# Patient Record
Sex: MALE | NOT HISPANIC OR LATINO | Employment: FULL TIME | URBAN - METROPOLITAN AREA
[De-identification: names, ages, dates, MRNs, and addresses within clinical notes are randomized per-mention and may not be internally consistent; named-entity substitution may affect disease eponyms.]

---

## 2023-05-30 DIAGNOSIS — I10 HTN (HYPERTENSION), BENIGN: ICD-10-CM

## 2023-05-30 PROBLEM — G89.29 CHRONIC PAIN OF RIGHT HEEL: Status: ACTIVE | Noted: 2023-05-30

## 2023-05-30 PROBLEM — Z86.0100 HISTORY OF COLON POLYPS: Status: ACTIVE | Noted: 2023-05-30

## 2023-05-30 PROBLEM — R79.89 ELEVATED LFTS: Status: ACTIVE | Noted: 2023-05-30

## 2023-05-30 PROBLEM — Z86.010 HISTORY OF COLON POLYPS: Status: ACTIVE | Noted: 2023-05-30

## 2023-05-30 PROBLEM — R17 ELEVATED BILIRUBIN: Status: ACTIVE | Noted: 2023-05-30

## 2023-05-30 PROBLEM — R80.9 PROTEINURIA: Status: ACTIVE | Noted: 2023-05-30

## 2023-05-30 PROBLEM — M79.671 CHRONIC PAIN OF RIGHT HEEL: Status: ACTIVE | Noted: 2023-05-30

## 2023-05-30 PROBLEM — N40.0 BPH WITHOUT URINARY OBSTRUCTION: Status: ACTIVE | Noted: 2023-05-30

## 2023-05-30 PROBLEM — E78.2 MIXED HYPERLIPIDEMIA: Status: ACTIVE | Noted: 2023-05-30

## 2023-05-30 RX ORDER — AMLODIPINE BESYLATE 5 MG/1
5 TABLET ORAL DAILY
COMMUNITY
Start: 2023-03-07 | End: 2023-05-30 | Stop reason: SDUPTHER

## 2023-05-30 RX ORDER — LISINOPRIL 40 MG/1
40 TABLET ORAL DAILY
Qty: 90 TABLET | Refills: 3 | OUTPATIENT
Start: 2023-05-30

## 2023-05-30 RX ORDER — LISINOPRIL 40 MG/1
40 TABLET ORAL DAILY
Qty: 90 TABLET | Refills: 3 | Status: SHIPPED | OUTPATIENT
Start: 2023-05-30 | End: 2024-02-26 | Stop reason: SDUPTHER

## 2023-05-30 RX ORDER — DICLOFENAC SODIUM AND MISOPROSTOL 75; 200 MG/1; UG/1
1 TABLET, DELAYED RELEASE ORAL 2 TIMES DAILY PRN
COMMUNITY
End: 2023-11-27 | Stop reason: SDUPTHER

## 2023-05-30 RX ORDER — ATORVASTATIN CALCIUM 10 MG/1
10 TABLET, FILM COATED ORAL NIGHTLY
COMMUNITY
End: 2024-01-09 | Stop reason: SDUPTHER

## 2023-05-30 RX ORDER — LISINOPRIL 40 MG/1
40 TABLET ORAL DAILY
COMMUNITY
Start: 2023-03-07 | End: 2023-05-30 | Stop reason: SDUPTHER

## 2023-05-30 RX ORDER — FINASTERIDE 5 MG/1
1 TABLET, FILM COATED ORAL DAILY
COMMUNITY
End: 2023-07-31 | Stop reason: SDUPTHER

## 2023-05-30 RX ORDER — AMLODIPINE BESYLATE 5 MG/1
5 TABLET ORAL DAILY
Qty: 90 TABLET | Refills: 3 | Status: SHIPPED | OUTPATIENT
Start: 2023-05-30 | End: 2024-02-26 | Stop reason: SDUPTHER

## 2023-05-30 RX ORDER — BENZONATATE 100 MG/1
CAPSULE ORAL
COMMUNITY
Start: 2022-07-24 | End: 2023-11-27 | Stop reason: ALTCHOICE

## 2023-07-31 DIAGNOSIS — N40.0 BPH WITHOUT URINARY OBSTRUCTION: ICD-10-CM

## 2023-07-31 RX ORDER — FINASTERIDE 5 MG/1
5 TABLET, FILM COATED ORAL DAILY
Qty: 90 TABLET | Refills: 1 | Status: SHIPPED | OUTPATIENT
Start: 2023-07-31 | End: 2023-08-01 | Stop reason: SDUPTHER

## 2023-08-01 DIAGNOSIS — N40.0 BPH WITHOUT URINARY OBSTRUCTION: ICD-10-CM

## 2023-08-01 NOTE — TELEPHONE ENCOUNTER
Please re-do refill he wants a written rx and he will pick it up Finasteride,  Call when ready    [phone]  update number

## 2023-08-02 RX ORDER — FINASTERIDE 5 MG/1
5 TABLET, FILM COATED ORAL DAILY
Qty: 90 TABLET | Refills: 1 | Status: SHIPPED | OUTPATIENT
Start: 2023-08-02 | End: 2023-08-10 | Stop reason: SDUPTHER

## 2023-08-10 DIAGNOSIS — N40.0 BPH WITHOUT URINARY OBSTRUCTION: ICD-10-CM

## 2023-08-10 RX ORDER — FINASTERIDE 5 MG/1
5 TABLET, FILM COATED ORAL DAILY
Qty: 90 TABLET | Refills: 3 | Status: SHIPPED | OUTPATIENT
Start: 2023-08-10

## 2023-11-27 ENCOUNTER — OFFICE VISIT (OUTPATIENT)
Dept: PRIMARY CARE | Facility: CLINIC | Age: 73
End: 2023-11-27
Payer: MEDICARE

## 2023-11-27 VITALS
DIASTOLIC BLOOD PRESSURE: 74 MMHG | RESPIRATION RATE: 14 BRPM | HEART RATE: 64 BPM | HEIGHT: 66 IN | WEIGHT: 224.5 LBS | BODY MASS INDEX: 36.08 KG/M2 | SYSTOLIC BLOOD PRESSURE: 134 MMHG

## 2023-11-27 DIAGNOSIS — Z00.00 MEDICARE ANNUAL WELLNESS VISIT, INITIAL: Primary | ICD-10-CM

## 2023-11-27 DIAGNOSIS — Z86.010 HISTORY OF COLON POLYPS: ICD-10-CM

## 2023-11-27 DIAGNOSIS — I10 HTN (HYPERTENSION), BENIGN: ICD-10-CM

## 2023-11-27 DIAGNOSIS — E78.2 MIXED HYPERLIPIDEMIA: ICD-10-CM

## 2023-11-27 DIAGNOSIS — E66.01 CLASS 2 SEVERE OBESITY DUE TO EXCESS CALORIES WITH SERIOUS COMORBIDITY AND BODY MASS INDEX (BMI) OF 36.0 TO 36.9 IN ADULT (MULTI): ICD-10-CM

## 2023-11-27 DIAGNOSIS — R80.9 PROTEINURIA, UNSPECIFIED TYPE: ICD-10-CM

## 2023-11-27 DIAGNOSIS — M79.671 CHRONIC PAIN OF RIGHT HEEL: ICD-10-CM

## 2023-11-27 DIAGNOSIS — G89.29 CHRONIC PAIN OF RIGHT HEEL: ICD-10-CM

## 2023-11-27 DIAGNOSIS — Z00.00 ROUTINE GENERAL MEDICAL EXAMINATION AT HEALTH CARE FACILITY: ICD-10-CM

## 2023-11-27 DIAGNOSIS — N40.0 BPH WITHOUT URINARY OBSTRUCTION: ICD-10-CM

## 2023-11-27 PROBLEM — E66.812 CLASS 2 SEVERE OBESITY DUE TO EXCESS CALORIES WITH SERIOUS COMORBIDITY AND BODY MASS INDEX (BMI) OF 36.0 TO 36.9 IN ADULT: Status: ACTIVE | Noted: 2023-11-27

## 2023-11-27 PROCEDURE — 3008F BODY MASS INDEX DOCD: CPT | Performed by: INTERNAL MEDICINE

## 2023-11-27 PROCEDURE — 3075F SYST BP GE 130 - 139MM HG: CPT | Performed by: INTERNAL MEDICINE

## 2023-11-27 PROCEDURE — G0402 INITIAL PREVENTIVE EXAM: HCPCS | Performed by: INTERNAL MEDICINE

## 2023-11-27 PROCEDURE — 3078F DIAST BP <80 MM HG: CPT | Performed by: INTERNAL MEDICINE

## 2023-11-27 PROCEDURE — 1170F FXNL STATUS ASSESSED: CPT | Performed by: INTERNAL MEDICINE

## 2023-11-27 PROCEDURE — 1159F MED LIST DOCD IN RCRD: CPT | Performed by: INTERNAL MEDICINE

## 2023-11-27 PROCEDURE — 80053 COMPREHEN METABOLIC PANEL: CPT

## 2023-11-27 PROCEDURE — 80061 LIPID PANEL: CPT

## 2023-11-27 PROCEDURE — 1036F TOBACCO NON-USER: CPT | Performed by: INTERNAL MEDICINE

## 2023-11-27 PROCEDURE — 84153 ASSAY OF PSA TOTAL: CPT

## 2023-11-27 PROCEDURE — 36415 COLL VENOUS BLD VENIPUNCTURE: CPT

## 2023-11-27 PROCEDURE — 81003 URINALYSIS AUTO W/O SCOPE: CPT

## 2023-11-27 PROCEDURE — 99214 OFFICE O/P EST MOD 30 MIN: CPT | Performed by: INTERNAL MEDICINE

## 2023-11-27 PROCEDURE — 85027 COMPLETE CBC AUTOMATED: CPT

## 2023-11-27 PROCEDURE — 1160F RVW MEDS BY RX/DR IN RCRD: CPT | Performed by: INTERNAL MEDICINE

## 2023-11-27 RX ORDER — DICLOFENAC SODIUM AND MISOPROSTOL 75; 200 MG/1; UG/1
1 TABLET, DELAYED RELEASE ORAL 2 TIMES DAILY PRN
Qty: 180 TABLET | Refills: 1 | Status: SHIPPED | OUTPATIENT
Start: 2023-11-27 | End: 2024-05-16 | Stop reason: SDUPTHER

## 2023-11-27 ASSESSMENT — ENCOUNTER SYMPTOMS
DYSURIA: 0
STRIDOR: 0
NECK STIFFNESS: 0
CHILLS: 0
SHORTNESS OF BREATH: 0
FLANK PAIN: 0
FREQUENCY: 0
APNEA: 0
EYE DISCHARGE: 0
SINUS PRESSURE: 0
SLEEP DISTURBANCE: 0
HYPERACTIVE: 0
LIGHT-HEADEDNESS: 0
APPETITE CHANGE: 0
BRUISES/BLEEDS EASILY: 0
PALPITATIONS: 0
POLYDIPSIA: 0
DIFFICULTY URINATING: 0
TROUBLE SWALLOWING: 0
DECREASED CONCENTRATION: 0
RECTAL PAIN: 0
CHOKING: 0
SPEECH DIFFICULTY: 0
AGITATION: 0
NUMBNESS: 0
CONSTIPATION: 1
COUGH: 0
FEVER: 0
WEAKNESS: 0
ABDOMINAL PAIN: 0
ABDOMINAL DISTENTION: 0
DIARRHEA: 0
NAUSEA: 0
NECK PAIN: 0
DIZZINESS: 0
FACIAL SWELLING: 0
VOMITING: 0
HALLUCINATIONS: 0
ACTIVITY CHANGE: 0
VOICE CHANGE: 0
ADENOPATHY: 0
RHINORRHEA: 0
ARTHRALGIAS: 0
POLYPHAGIA: 0
JOINT SWELLING: 0
HEADACHES: 0
CONFUSION: 0
MYALGIAS: 0
EYE ITCHING: 0
FACIAL ASYMMETRY: 0
BACK PAIN: 0
PHOTOPHOBIA: 0
COLOR CHANGE: 0
ANAL BLEEDING: 0
EYE REDNESS: 0
SEIZURES: 0
FATIGUE: 0
WOUND: 0
EYE PAIN: 0
BLOOD IN STOOL: 0
CHEST TIGHTNESS: 0
SINUS PAIN: 0
NERVOUS/ANXIOUS: 0
HEMATURIA: 0
DYSPHORIC MOOD: 0
UNEXPECTED WEIGHT CHANGE: 0
WHEEZING: 0
DIAPHORESIS: 0
TREMORS: 0
SORE THROAT: 0

## 2023-11-27 ASSESSMENT — ACTIVITIES OF DAILY LIVING (ADL)
TAKING_MEDICATION: INDEPENDENT
DOING_HOUSEWORK: INDEPENDENT
GROCERY_SHOPPING: INDEPENDENT
MANAGING_FINANCES: INDEPENDENT
DRESSING: INDEPENDENT
BATHING: INDEPENDENT

## 2023-11-27 ASSESSMENT — VISUAL ACUITY
OD_CC: 20/25
OS_CC: 20/25

## 2023-11-27 ASSESSMENT — PATIENT HEALTH QUESTIONNAIRE - PHQ9
2. FEELING DOWN, DEPRESSED OR HOPELESS: NOT AT ALL
1. LITTLE INTEREST OR PLEASURE IN DOING THINGS: NOT AT ALL
SUM OF ALL RESPONSES TO PHQ9 QUESTIONS 1 AND 2: 0

## 2023-11-27 NOTE — PROGRESS NOTES
Subjective   Reason for Visit: Hugh Ruano is an 73 y.o. male here for a Medicare Wellness visit.     Past Medical, Surgical, and Family History reviewed and updated in chart.    Reviewed all medications by prescribing practitioner or clinical pharmacist (such as prescriptions, OTCs, herbal therapies and supplements) and documented in the medical record.    HPI  Pt here for MWV.  He retired back in 2/2023.  He has been hiking 3-4 times a week for exercise.  He is up in weight.      He takes his BP at home-120/80 in AM and 140/80 in the later part of day.  He is taking his Lisinopril/Amlodipine.      He is having no urinary issues.  He is able to sleep through night.  Takes finasteride.      He has to use laxatives 2-3 times a week due to constipation.  He had colonoscopy in 2021 and did have 2 polyps.  He has no blood/black tarry stools.  He doesn't have any abdominal pain.  He will repeat in 5 years.  He does have family history of colon cancer (mother).      He takes Arthotec 1pill at minimum a day for heel pain and this does help.     He sees Derm and just had basal cell CA removed 2 weeks ago.      He had Covid and Flu shot already.  He had his shingrix and pneumonia series.        Patient Care Team:  Elvie MELÉNDEZ DO as PCP - General     Review of Systems   Constitutional:  Negative for activity change, appetite change, chills, diaphoresis, fatigue, fever and unexpected weight change.   HENT:  Negative for congestion, dental problem, drooling, ear discharge, ear pain, facial swelling, hearing loss, mouth sores, nosebleeds, postnasal drip, rhinorrhea, sinus pressure, sinus pain, sneezing, sore throat, tinnitus, trouble swallowing and voice change.    Eyes:  Positive for visual disturbance (wears glasses-up to date on exam). Negative for photophobia, pain, discharge, redness and itching.   Respiratory:  Negative for apnea, cough, choking, chest tightness, shortness of breath, wheezing and stridor.   "  Cardiovascular:  Negative for chest pain, palpitations and leg swelling.   Gastrointestinal:  Positive for constipation. Negative for abdominal distention, abdominal pain, anal bleeding, blood in stool, diarrhea, nausea, rectal pain and vomiting.   Endocrine: Negative for cold intolerance, heat intolerance, polydipsia, polyphagia and polyuria.   Genitourinary:  Negative for decreased urine volume, difficulty urinating, dysuria, enuresis, flank pain, frequency, genital sores, hematuria, penile discharge, penile pain, penile swelling, scrotal swelling, testicular pain and urgency.   Musculoskeletal:  Negative for arthralgias, back pain, gait problem, joint swelling, myalgias, neck pain and neck stiffness.   Skin:  Negative for color change, pallor, rash and wound.   Allergic/Immunologic: Negative for environmental allergies, food allergies and immunocompromised state.   Neurological:  Negative for dizziness, tremors, seizures, syncope, facial asymmetry, speech difficulty, weakness, light-headedness, numbness and headaches.   Hematological:  Negative for adenopathy. Does not bruise/bleed easily.   Psychiatric/Behavioral:  Negative for agitation, behavioral problems, confusion, decreased concentration, dysphoric mood, hallucinations, self-injury, sleep disturbance and suicidal ideas. The patient is not nervous/anxious and is not hyperactive.        Objective   Vitals:  /74 (BP Location: Left arm, Patient Position: Sitting)   Pulse 64   Resp 14   Ht 1.67 m (5' 5.75\")   Wt 102 kg (224 lb 8 oz)   BMI 36.51 kg/m²       Physical Exam  Constitutional:       Appearance: Normal appearance. He is obese.   HENT:      Head: Normocephalic and atraumatic.      Right Ear: Tympanic membrane, ear canal and external ear normal. There is no impacted cerumen.      Left Ear: Tympanic membrane, ear canal and external ear normal. There is no impacted cerumen.      Nose: Nose normal. No congestion or rhinorrhea.      " Mouth/Throat:      Mouth: Mucous membranes are moist.      Pharynx: Oropharynx is clear. No oropharyngeal exudate or posterior oropharyngeal erythema.   Eyes:      Extraocular Movements: Extraocular movements intact.      Conjunctiva/sclera: Conjunctivae normal.      Pupils: Pupils are equal, round, and reactive to light.   Neck:      Vascular: No carotid bruit.   Cardiovascular:      Rate and Rhythm: Normal rate and regular rhythm.      Pulses: Normal pulses.      Heart sounds: Normal heart sounds. No murmur heard.  Pulmonary:      Effort: Pulmonary effort is normal. No respiratory distress.      Breath sounds: Normal breath sounds. No wheezing, rhonchi or rales.   Abdominal:      General: Abdomen is flat. Bowel sounds are normal. There is no distension.      Palpations: Abdomen is soft.      Tenderness: There is no abdominal tenderness.      Hernia: No hernia is present.   Musculoskeletal:         General: No swelling or tenderness. Normal range of motion.      Cervical back: Normal range of motion and neck supple.      Right lower leg: No edema.      Left lower leg: No edema.   Lymphadenopathy:      Cervical: No cervical adenopathy.   Skin:     General: Skin is warm and dry.      Findings: No lesion or rash.   Neurological:      General: No focal deficit present.      Mental Status: He is alert and oriented to person, place, and time.      Cranial Nerves: No cranial nerve deficit.      Sensory: No sensory deficit.      Motor: No weakness.   Psychiatric:         Mood and Affect: Mood normal.         Behavior: Behavior normal.         Thought Content: Thought content normal.         Judgment: Judgment normal.         Assessment/Plan   Problem List Items Addressed This Visit       BPH without urinary obstruction    Current Assessment & Plan     On Finasteride   PSA testing today          Relevant Orders    Prostate Specific Antigen    Follow Up In Primary Care - Established    Chronic pain of right heel    Current  Assessment & Plan     Uses Diclofenac combo one a day  Good supporting shoes         Relevant Medications    diclofenac-misoprostoL (Arthrotec 75)  mg-mcg EC tablet    Other Relevant Orders    Follow Up In Primary Care - Established    History of colon polyps    Current Assessment & Plan     Had colonoscopy in 2021  Repeat in 5 years (2026)         HTN (hypertension), benign    Current Assessment & Plan     Adequate control on current regimen  Low salt diet  Weight loss         Relevant Orders    Comprehensive Metabolic Panel    CBC    Urinalysis with Reflex Microscopic    Follow Up In Primary Care - Established    Mixed hyperlipidemia    Current Assessment & Plan     On statin therapy  Repeat lipids today          Relevant Orders    Lipid Panel    Follow Up In Primary Care - Established    Proteinuria    Relevant Orders    Follow Up In Primary Care - Established    Class 2 severe obesity due to excess calories with serious comorbidity and body mass index (BMI) of 36.0 to 36.9 in adult (CMS/[facility])    Current Assessment & Plan     Recommend weight loss with healthy diet and exercise          Other Visit Diagnoses       Medicare annual wellness visit, initial    -  Primary    Routine general medical examination at health care facility

## 2023-11-27 NOTE — PATIENT INSTRUCTIONS
Bring in copy of living will/DPOA of healthcare for our records   Continue medications as directed-refills provided  Continue exercising with goal of weight loss-healthy diet (lean meats/fish) and exercise-goal of 150 minutes/week  Follow up in 6 months

## 2023-11-28 LAB
ALBUMIN SERPL BCP-MCNC: 4.8 G/DL (ref 3.4–5)
ALP SERPL-CCNC: 74 U/L (ref 33–136)
ALT SERPL W P-5'-P-CCNC: 48 U/L (ref 10–52)
ANION GAP SERPL CALC-SCNC: 15 MMOL/L (ref 10–20)
APPEARANCE UR: ABNORMAL
AST SERPL W P-5'-P-CCNC: 23 U/L (ref 9–39)
BILIRUB SERPL-MCNC: 0.5 MG/DL (ref 0–1.2)
BILIRUB UR STRIP.AUTO-MCNC: NEGATIVE MG/DL
BUN SERPL-MCNC: 21 MG/DL (ref 6–23)
CALCIUM SERPL-MCNC: 9.7 MG/DL (ref 8.6–10.6)
CHLORIDE SERPL-SCNC: 103 MMOL/L (ref 98–107)
CHOLEST SERPL-MCNC: 155 MG/DL (ref 0–199)
CHOLESTEROL/HDL RATIO: 3.2
CO2 SERPL-SCNC: 28 MMOL/L (ref 21–32)
COLOR UR: ABNORMAL
CREAT SERPL-MCNC: 1.15 MG/DL (ref 0.5–1.3)
ERYTHROCYTE [DISTWIDTH] IN BLOOD BY AUTOMATED COUNT: 11.9 % (ref 11.5–14.5)
GFR SERPL CREATININE-BSD FRML MDRD: 68 ML/MIN/1.73M*2
GLUCOSE SERPL-MCNC: 89 MG/DL (ref 74–99)
GLUCOSE UR STRIP.AUTO-MCNC: NEGATIVE MG/DL
HCT VFR BLD AUTO: 47.7 % (ref 41–52)
HDLC SERPL-MCNC: 47.8 MG/DL
HGB BLD-MCNC: 15 G/DL (ref 13.5–17.5)
KETONES UR STRIP.AUTO-MCNC: NEGATIVE MG/DL
LDLC SERPL CALC-MCNC: 82 MG/DL
LEUKOCYTE ESTERASE UR QL STRIP.AUTO: NEGATIVE
MCH RBC QN AUTO: 28.9 PG (ref 26–34)
MCHC RBC AUTO-ENTMCNC: 31.4 G/DL (ref 32–36)
MCV RBC AUTO: 92 FL (ref 80–100)
NITRITE UR QL STRIP.AUTO: NEGATIVE
NON HDL CHOLESTEROL: 107 MG/DL (ref 0–149)
NRBC BLD-RTO: 0 /100 WBCS (ref 0–0)
PH UR STRIP.AUTO: 5 [PH]
PLATELET # BLD AUTO: 333 X10*3/UL (ref 150–450)
POTASSIUM SERPL-SCNC: 4.4 MMOL/L (ref 3.5–5.3)
PROT SERPL-MCNC: 7 G/DL (ref 6.4–8.2)
PROT UR STRIP.AUTO-MCNC: NEGATIVE MG/DL
PSA SERPL-MCNC: 0.18 NG/ML
RBC # BLD AUTO: 5.19 X10*6/UL (ref 4.5–5.9)
RBC # UR STRIP.AUTO: NEGATIVE /UL
SODIUM SERPL-SCNC: 142 MMOL/L (ref 136–145)
SP GR UR STRIP.AUTO: 1.02
TRIGL SERPL-MCNC: 127 MG/DL (ref 0–149)
UROBILINOGEN UR STRIP.AUTO-MCNC: <2 MG/DL
VLDL: 25 MG/DL (ref 0–40)
WBC # BLD AUTO: 7.4 X10*3/UL (ref 4.4–11.3)

## 2024-01-09 DIAGNOSIS — E78.2 MIXED HYPERLIPIDEMIA: Primary | ICD-10-CM

## 2024-01-09 RX ORDER — ATORVASTATIN CALCIUM 10 MG/1
10 TABLET, FILM COATED ORAL NIGHTLY
Qty: 90 TABLET | Refills: 1 | Status: SHIPPED | OUTPATIENT
Start: 2024-01-09 | End: 2024-02-26 | Stop reason: SDUPTHER

## 2024-02-26 DIAGNOSIS — E78.2 MIXED HYPERLIPIDEMIA: ICD-10-CM

## 2024-02-26 DIAGNOSIS — I10 HTN (HYPERTENSION), BENIGN: ICD-10-CM

## 2024-02-26 RX ORDER — AMLODIPINE BESYLATE 5 MG/1
5 TABLET ORAL DAILY
Qty: 90 TABLET | Refills: 3 | Status: SHIPPED | OUTPATIENT
Start: 2024-02-26

## 2024-02-26 RX ORDER — LISINOPRIL 40 MG/1
40 TABLET ORAL DAILY
Qty: 90 TABLET | Refills: 3 | Status: SHIPPED | OUTPATIENT
Start: 2024-02-26

## 2024-02-26 RX ORDER — ATORVASTATIN CALCIUM 10 MG/1
10 TABLET, FILM COATED ORAL NIGHTLY
Qty: 90 TABLET | Refills: 3 | Status: SHIPPED | OUTPATIENT
Start: 2024-02-26

## 2024-05-16 DIAGNOSIS — M79.671 CHRONIC PAIN OF RIGHT HEEL: ICD-10-CM

## 2024-05-16 DIAGNOSIS — G89.29 CHRONIC PAIN OF RIGHT HEEL: ICD-10-CM

## 2024-05-16 RX ORDER — DICLOFENAC SODIUM AND MISOPROSTOL 75; 200 MG/1; UG/1
1 TABLET, DELAYED RELEASE ORAL 2 TIMES DAILY PRN
Qty: 180 TABLET | Refills: 1 | Status: SHIPPED | OUTPATIENT
Start: 2024-05-16

## 2024-05-30 ENCOUNTER — OFFICE VISIT (OUTPATIENT)
Dept: PRIMARY CARE | Facility: CLINIC | Age: 74
End: 2024-05-30
Payer: MEDICARE

## 2024-05-30 VITALS
SYSTOLIC BLOOD PRESSURE: 144 MMHG | DIASTOLIC BLOOD PRESSURE: 78 MMHG | WEIGHT: 224.1 LBS | HEART RATE: 68 BPM | BODY MASS INDEX: 36.45 KG/M2

## 2024-05-30 DIAGNOSIS — G89.29 CHRONIC PAIN OF RIGHT HEEL: ICD-10-CM

## 2024-05-30 DIAGNOSIS — E78.2 MIXED HYPERLIPIDEMIA: ICD-10-CM

## 2024-05-30 DIAGNOSIS — R80.9 PROTEINURIA, UNSPECIFIED TYPE: ICD-10-CM

## 2024-05-30 DIAGNOSIS — N40.0 BPH WITHOUT URINARY OBSTRUCTION: ICD-10-CM

## 2024-05-30 DIAGNOSIS — E66.01 CLASS 2 SEVERE OBESITY DUE TO EXCESS CALORIES WITH SERIOUS COMORBIDITY AND BODY MASS INDEX (BMI) OF 36.0 TO 36.9 IN ADULT (MULTI): ICD-10-CM

## 2024-05-30 DIAGNOSIS — I10 HTN (HYPERTENSION), BENIGN: Primary | ICD-10-CM

## 2024-05-30 DIAGNOSIS — K21.9 GASTROESOPHAGEAL REFLUX DISEASE WITHOUT ESOPHAGITIS: ICD-10-CM

## 2024-05-30 DIAGNOSIS — M79.671 CHRONIC PAIN OF RIGHT HEEL: ICD-10-CM

## 2024-05-30 PROCEDURE — 1159F MED LIST DOCD IN RCRD: CPT | Performed by: INTERNAL MEDICINE

## 2024-05-30 PROCEDURE — 1123F ACP DISCUSS/DSCN MKR DOCD: CPT | Performed by: INTERNAL MEDICINE

## 2024-05-30 PROCEDURE — 36415 COLL VENOUS BLD VENIPUNCTURE: CPT

## 2024-05-30 PROCEDURE — 99214 OFFICE O/P EST MOD 30 MIN: CPT | Performed by: INTERNAL MEDICINE

## 2024-05-30 PROCEDURE — 80048 BASIC METABOLIC PNL TOTAL CA: CPT

## 2024-05-30 PROCEDURE — 3077F SYST BP >= 140 MM HG: CPT | Performed by: INTERNAL MEDICINE

## 2024-05-30 PROCEDURE — 3008F BODY MASS INDEX DOCD: CPT | Performed by: INTERNAL MEDICINE

## 2024-05-30 PROCEDURE — 1160F RVW MEDS BY RX/DR IN RCRD: CPT | Performed by: INTERNAL MEDICINE

## 2024-05-30 PROCEDURE — 3078F DIAST BP <80 MM HG: CPT | Performed by: INTERNAL MEDICINE

## 2024-05-30 RX ORDER — OMEPRAZOLE 20 MG/1
20-40 CAPSULE, DELAYED RELEASE ORAL DAILY PRN
Qty: 90 CAPSULE | Refills: 0 | Status: SHIPPED | OUTPATIENT
Start: 2024-05-30 | End: 2026-05-20

## 2024-05-30 ASSESSMENT — ENCOUNTER SYMPTOMS
ACTIVITY CHANGE: 0
CHEST TIGHTNESS: 0
VOMITING: 0
HEADACHES: 0
WHEEZING: 0
ROS GI COMMENTS: +GERD
PALPITATIONS: 0
NAUSEA: 0
FEVER: 0
BACK PAIN: 0
NERVOUS/ANXIOUS: 0
LIGHT-HEADEDNESS: 0
DIAPHORESIS: 0
DIFFICULTY URINATING: 0
APPETITE CHANGE: 0
UNEXPECTED WEIGHT CHANGE: 0
ARTHRALGIAS: 0
SLEEP DISTURBANCE: 0
FATIGUE: 0
DYSURIA: 0
CHILLS: 0
CONSTIPATION: 0
DYSPHORIC MOOD: 0
DIZZINESS: 0
DIARRHEA: 0
SHORTNESS OF BREATH: 0
COUGH: 0
ABDOMINAL PAIN: 0

## 2024-05-30 NOTE — PATIENT INSTRUCTIONS
We printed a script for Omeprazole 20 mg to use as needed when you have acid reflux which could be cause of cough  Continue to work hard on weight loss-healthy diet-lean protein/fish and veggies-lower carbs/sugar intake  Exercise with goal of 150 minutes/week  We did small amount of blood work today  Continue all medications as directed-call when refills needed  Follow up in 6 months for full physical (come fasting for full blood work and urine panel)

## 2024-05-30 NOTE — ASSESSMENT & PLAN NOTE
Weight is stable   He walks daily and riding his bike some  Appetite is good but admits to eating a lot of dairy

## 2024-05-30 NOTE — PROGRESS NOTES
"Subjective   Patient ID: Hugh Saucedadnefrosa maria \"Lashay" is a 73 y.o. male who presents for Follow-up (6m).    HPI  Pt here in 6 month follow up.  He recently went to [place] for 3.5 weeks.  He admits to eating a lot while on vacation.      He is on his medications as directed.  He takes the arthrotec daily due to joint pain.      He has some swelling of LE's when he did fly overseas or when sitting for long periods of time.      He has a cough at times and admits to some GERD with certain foods/drinks.  He has taken a few of his wife's Omeprazole 40 mg with good relief.      Review of Systems   Constitutional:  Negative for activity change, appetite change, chills, diaphoresis, fatigue, fever and unexpected weight change.   Respiratory:  Negative for cough, chest tightness, shortness of breath and wheezing.    Cardiovascular:  Positive for leg swelling. Negative for chest pain and palpitations.   Gastrointestinal:  Negative for abdominal pain, constipation, diarrhea, nausea and vomiting.        +GERD    Genitourinary:  Negative for difficulty urinating and dysuria.   Musculoskeletal:  Negative for arthralgias and back pain.   Neurological:  Negative for dizziness, light-headedness and headaches.   Psychiatric/Behavioral:  Negative for dysphoric mood and sleep disturbance. The patient is not nervous/anxious.        Objective   /78 (BP Location: Left arm, Patient Position: Sitting)   Pulse 68   Wt 102 kg (224 lb 1.6 oz)   BMI 36.45 kg/m²    Physical Exam  Constitutional:       Appearance: Normal appearance. He is obese.   Cardiovascular:      Rate and Rhythm: Normal rate and regular rhythm.      Heart sounds: Normal heart sounds.   Pulmonary:      Effort: Pulmonary effort is normal.      Breath sounds: Normal breath sounds.   Abdominal:      General: Bowel sounds are normal.      Palpations: Abdomen is soft.   Musculoskeletal:      Right lower leg: No edema.      Left lower leg: No edema.   Lymphadenopathy: "      Cervical: No cervical adenopathy.   Neurological:      Mental Status: He is alert and oriented to person, place, and time.   Psychiatric:         Mood and Affect: Mood normal.         Assessment/Plan   Problem List Items Addressed This Visit       BPH without urinary obstruction     On Finasteride          Relevant Orders    Follow Up In Primary Care - Medicare Annual    Chronic pain of right heel    HTN (hypertension), benign - Primary     A bit high today on Amlodipine and Lisinopril  Recommend working on weight loss          Relevant Orders    Basic Metabolic Panel    Follow Up In Primary Care - Medicare Annual    Mixed hyperlipidemia     On statin therapy          Relevant Orders    Follow Up In Primary Care - Medicare Annual    Proteinuria    Relevant Orders    Follow Up In Primary Care - Medicare Annual    Class 2 severe obesity due to excess calories with serious comorbidity and body mass index (BMI) of 36.0 to 36.9 in adult (Multi)     Weight is stable   He walks daily and riding his bike some  Appetite is good but admits to eating a lot of dairy          Other Visit Diagnoses       Gastroesophageal reflux disease without esophagitis        Relevant Medications    omeprazole (PriLOSEC) 20 mg DR capsule    Other Relevant Orders    Follow Up In Primary Care - Medicare Annual

## 2024-05-31 LAB
ANION GAP SERPL CALC-SCNC: 14 MMOL/L (ref 10–20)
BUN SERPL-MCNC: 21 MG/DL (ref 6–23)
CALCIUM SERPL-MCNC: 9.7 MG/DL (ref 8.6–10.6)
CHLORIDE SERPL-SCNC: 101 MMOL/L (ref 98–107)
CO2 SERPL-SCNC: 27 MMOL/L (ref 21–32)
CREAT SERPL-MCNC: 1.1 MG/DL (ref 0.5–1.3)
EGFRCR SERPLBLD CKD-EPI 2021: 71 ML/MIN/1.73M*2
GLUCOSE SERPL-MCNC: 73 MG/DL (ref 74–99)
POTASSIUM SERPL-SCNC: 4.4 MMOL/L (ref 3.5–5.3)
SODIUM SERPL-SCNC: 138 MMOL/L (ref 136–145)

## 2024-06-12 ENCOUNTER — APPOINTMENT (OUTPATIENT)
Dept: ORTHOPEDIC SURGERY | Facility: CLINIC | Age: 74
End: 2024-06-12
Payer: MEDICARE

## 2024-06-12 ENCOUNTER — HOSPITAL ENCOUNTER (OUTPATIENT)
Dept: RADIOLOGY | Facility: CLINIC | Age: 74
Discharge: HOME | End: 2024-06-12
Payer: MEDICARE

## 2024-06-12 VITALS — HEIGHT: 66 IN | WEIGHT: 216 LBS | BODY MASS INDEX: 34.72 KG/M2

## 2024-06-12 DIAGNOSIS — M79.672 FOOT PAIN, LEFT: Primary | ICD-10-CM

## 2024-06-12 DIAGNOSIS — M76.70 PERONEAL TENDINITIS, UNSPECIFIED LATERALITY: ICD-10-CM

## 2024-06-12 DIAGNOSIS — M79.672 FOOT PAIN, LEFT: ICD-10-CM

## 2024-06-12 PROCEDURE — 1123F ACP DISCUSS/DSCN MKR DOCD: CPT | Performed by: ORTHOPAEDIC SURGERY

## 2024-06-12 PROCEDURE — 73630 X-RAY EXAM OF FOOT: CPT | Mod: LEFT SIDE | Performed by: RADIOLOGY

## 2024-06-12 PROCEDURE — 3008F BODY MASS INDEX DOCD: CPT | Performed by: ORTHOPAEDIC SURGERY

## 2024-06-12 PROCEDURE — 73630 X-RAY EXAM OF FOOT: CPT | Mod: LT

## 2024-06-12 PROCEDURE — 1159F MED LIST DOCD IN RCRD: CPT | Performed by: ORTHOPAEDIC SURGERY

## 2024-06-12 PROCEDURE — 99203 OFFICE O/P NEW LOW 30 MIN: CPT | Performed by: ORTHOPAEDIC SURGERY

## 2024-06-12 PROCEDURE — 1036F TOBACCO NON-USER: CPT | Performed by: ORTHOPAEDIC SURGERY

## 2024-06-12 PROCEDURE — 1125F AMNT PAIN NOTED PAIN PRSNT: CPT | Performed by: ORTHOPAEDIC SURGERY

## 2024-06-12 RX ORDER — PREDNISONE 10 MG/1
10 TABLET ORAL SEE ADMIN INSTRUCTIONS
Qty: 42 TABLET | Refills: 0 | Status: SHIPPED | OUTPATIENT
Start: 2024-06-12 | End: 2024-07-03

## 2024-06-12 RX ORDER — PREDNISONE 10 MG/1
10 TABLET ORAL SEE ADMIN INSTRUCTIONS
Qty: 42 TABLET | Refills: 0 | Status: SHIPPED | OUTPATIENT
Start: 2024-06-12 | End: 2024-06-12

## 2024-06-12 ASSESSMENT — PAIN - FUNCTIONAL ASSESSMENT: PAIN_FUNCTIONAL_ASSESSMENT: 0-10

## 2024-06-12 ASSESSMENT — PAIN SCALES - GENERAL: PAINLEVEL_OUTOF10: 3

## 2024-06-12 NOTE — PROGRESS NOTES
73-year-old male here for left foot pain.  He has had some slight pain over the lateral part of his left foot over the past month.  While walking on memorial day felt a sharp pop/pain on the lateral part of his foot while walking on an incline.  He takes diclofenac routinely for arthritis in his right foot which has not helped.  No diabetes.    On exam:  WD/WN overweight male  A+O X3  NAD  No lymphedema  Inspection of both feet and ankles show symmetric arches.   Able to STR bilaterally.   5/5 strength in all 4 planes.  Tender over left distal peroneal tendon.  No crepitus.  Sensation intact to LT.   Good pulses.   Stable anterior drawer.  No peroneal subluxation.    (-) Pierre.     I personally reviewed the following radiographic exams: X-ray left foot unremarkable.    Assessment: Left peroneal tendinitis/peroneal strain.    Plan: Discussed nonoperative and operative options in detail.   Risk and benefits discussed in detail. All questions answered today.  Recovery timeline and expectations discussed in detail.  Gave prescription for physical therapy.  Will place on a prednisone taper and have him stop diclofenac for few weeks.  Should improve with time and therapy.  Follow-up with pain continues.

## 2024-07-15 ENCOUNTER — EVALUATION (OUTPATIENT)
Dept: PHYSICAL THERAPY | Facility: CLINIC | Age: 74
End: 2024-07-15
Payer: MEDICARE

## 2024-07-15 DIAGNOSIS — M76.70 PERONEAL TENDINITIS, UNSPECIFIED LATERALITY: ICD-10-CM

## 2024-07-15 DIAGNOSIS — M25.572 LEFT ANKLE PAIN: Primary | ICD-10-CM

## 2024-07-15 PROCEDURE — 97110 THERAPEUTIC EXERCISES: CPT | Mod: GP | Performed by: PHYSICAL THERAPIST

## 2024-07-15 PROCEDURE — 97161 PT EVAL LOW COMPLEX 20 MIN: CPT | Mod: GP | Performed by: PHYSICAL THERAPIST

## 2024-07-15 SDOH — ECONOMIC STABILITY: FOOD INSECURITY: WITHIN THE PAST 12 MONTHS, YOU WORRIED THAT YOUR FOOD WOULD RUN OUT BEFORE YOU GOT MONEY TO BUY MORE.: NEVER TRUE

## 2024-07-15 SDOH — ECONOMIC STABILITY: FOOD INSECURITY: WITHIN THE PAST 12 MONTHS, THE FOOD YOU BOUGHT JUST DIDN'T LAST AND YOU DIDN'T HAVE MONEY TO GET MORE.: NEVER TRUE

## 2024-07-15 ASSESSMENT — LIFESTYLE VARIABLES
HOW OFTEN DO YOU HAVE SIX OR MORE DRINKS ON ONE OCCASION: NEVER
SKIP TO QUESTIONS 9-10: 1
HOW OFTEN DO YOU HAVE A DRINK CONTAINING ALCOHOL: NEVER
HOW MANY STANDARD DRINKS CONTAINING ALCOHOL DO YOU HAVE ON A TYPICAL DAY: PATIENT DOES NOT DRINK
AUDIT-C TOTAL SCORE: 0

## 2024-07-15 ASSESSMENT — ENCOUNTER SYMPTOMS
LOSS OF SENSATION IN FEET: 0
DEPRESSION: 0
OCCASIONAL FEELINGS OF UNSTEADINESS: 0

## 2024-07-15 ASSESSMENT — PAIN DESCRIPTION - DESCRIPTORS: DESCRIPTORS: ACHING

## 2024-07-15 ASSESSMENT — PAIN - FUNCTIONAL ASSESSMENT: PAIN_FUNCTIONAL_ASSESSMENT: 0-10

## 2024-07-15 ASSESSMENT — PAIN SCALES - GENERAL: PAINLEVEL_OUTOF10: 2

## 2024-07-15 NOTE — PROGRESS NOTES
Physical Therapy    Physical Therapy Lower Extremity Evaluation    Patient Name: Hugh Ruano  MRN: [id number]  Today's Date: 7/15/2024  Time Calculation  Start Time: 1000  Stop Time: 1045  Time Calculation (min): 45 min  PT Evaluation Time Entry  PT Evaluation (Low) Time Entry: 35  PT Therapeutic Procedures Time Entry  Therapeutic Exercise Time Entry: 10  Payor: MEDICARE / Plan: MEDICARE PART A AND B / Product Type: *No Product type* /     Reason for Referral: L ankle peroneal tendonitis  Referred By: Juan M Payne Comment: Visit 1 of [place]    Current Problem  Problem List Items Addressed This Visit             ICD-10-CM    Left ankle pain - Primary M25.572    Relevant Orders    Follow Up In Physical Therapy    Peroneal tendinitis M76.70    Relevant Orders    Follow Up In Physical Therapy      Precautions  Precautions  STEADI Fall Risk Score (The score of 4 or more indicates an increased risk of falling): 0  Precautions Comment: none     Pain  Pain Assessment: 0-10  0-10 (Numeric) Pain Score: 2  Pain Location: Foot  Pain Orientation: Left, Outer  Pain Descriptors: Aching  Pain Frequency: Intermittent  Pain Onset: Gradual    SUBJECTIVE:   Chief complaint:  Pt reports he had an onset of pain in the L lateral foot and ankle in mid May 2024 while walking. Notes pain progressively worsened over the past few weeks. Denies pain at rest. Worse with walking. Denies any weakness or stiffness/tightness, also denies edema. NO other prior hx of I jury other than ankle sprain several years ago.     Pain Better: rest    Pain Worse: walking > 1/2 hr, quick movements     Imaging:x-rays 6/12/24 IMPRESSION: Mild left foot degenerative changes. No acute findings.    Prior level of function: previously independent with all prior activity and gait  Current limitations: reduced gait and weight bearing activity.     Home setup: reviewed and no concern     Work: retired    Patients goal: walk without with pain     Prior tx: no prior PT  tx this yr.     Medical hx has been reviewed with the patient.     OBJECTIVE:    Lower Extremity Strength:  MMT 5/5 max  RIGHT LEFT   Ankle DF 5 4+   Ankle PF 5 5   Ankle INV 5 4+   Ankle EV 5 4+     Lower Extremity ROM: WNL unless documented below:  ROM in Degrees  RIGHT LEFT   Ankle DF 10 6   Ankle PF 60 59   Ankle INV 30 25   Ankle EV 5 10     Joint mobility WNL L ankle   Gait mechanics: Increased toe out B LE with push off through medial foot and big toe.   Palpation: Mid tenderness peroneal tendons L lateral ankle.     Special tests:  ANKLE RIGHT LEFT   Anterior Drawer  neg   Squeeze for high ankle sprain     Ariza's     Talar Tilt  Neg      Other Measures  Lower Extremity Funtional Score (LEFS): 58/80     TREATMENT:  Eval  2. Instruction in a HEP:   Access Code: [email or web address]  URL: [email or web address]  Date: 07/15/2024  Prepared by: RICKIE Moore    Exercises  - Long Sitting Calf Stretch with Strap  - 1 x daily - 7 x weekly - 1 sets - 3 reps - 30 sec hold  - Gastroc Stretch on Wall  - 1 x daily - 7 x weekly - 1 sets - 3 reps - 30 sec hold  - Seated Ankle Plantarflexion Dorsiflexion PROM  - 1 x daily - 7 x weekly - 1 sets - 3 reps - 30 sec hold  - Seated Ankle Eversion with Resistance  - 1 x daily - 7 x weekly - 2 sets - 10 reps  - Seated Ankle Dorsiflexion with Resistance  - 1 x daily - 7 x weekly - 2 sets - 10 reps  - Seated Ankle Plantar Flexion with Resistance Loop (Mirrored)  - 1 x daily - 7 x weekly - 2 sets - 10 reps  - Seated Ankle Dorsiflexion with Resistance (Mirrored)  - 1 x daily - 7 x weekly - 3 sets - 10 reps    ASSESSEMENT  Pt is a 73 y.o. referred to physical therapy for a dx of L peroneal tendonitis  by Rajesh Mcgowan MD. Pt presents with signs and symptoms of pain, weakness, reduced gait/balance and overall educed function. This pt would benefit from a therapy program to restore prior level of function, reduce pain, increase AROM, increase strength, and improve gait and  balance.   Complexity: Low  Clinical presentation: Stable and/or uncomplicated characteristics  The physical therapy prognosis is good for the patient to achieve their goals.   The pt tolerated therapy treatment today well with no adverse effects.  Personal Factors/Barriers to therapy include:  none     PLAN  The pt will be seen 2 days a week for 6 weeks.      The pt has been educated about the risks and benefits of physical therapy including manual therapy treatments. Pt agrees with POC and gives consent for treatment.     The patient will benefit from physical therapy treatment to include: therapeutic exercises, therapeutic activities, neurological re-education, manual therapy, modalities, and a home exercise program.     Goals:  Active       PT Problem       Reduce pain at worst to 0/10 with all prior activity.        Start:  07/15/24    Expected End:  08/04/24            Pt to increase L ankle strength to grossly 5/5 for improved gait, stairs, lifting and ADL's.        Start:  07/15/24    Expected End:  08/26/24            Pt to increase L ankle AROM all ranges equal the  R for gait and ADL's/hiking with no issues.        Start:  07/15/24    Expected End:  08/26/24            Pt to score 80/80 points on LEFS to display overall increased function.         Start:  07/15/24    Expected End:  08/26/24            Pt to be independent with a HEP for self management.        Start:  07/15/24    Expected End:  08/26/24                  PAST SURGICAL HISTORY:  S/P bunionectomy left foot    S/P cholecystectomy     S/P tubal ligation

## 2024-07-17 ENCOUNTER — TREATMENT (OUTPATIENT)
Dept: PHYSICAL THERAPY | Facility: CLINIC | Age: 74
End: 2024-07-17
Payer: MEDICARE

## 2024-07-17 DIAGNOSIS — M25.572 LEFT ANKLE PAIN: Primary | ICD-10-CM

## 2024-07-17 DIAGNOSIS — M76.70 PERONEAL TENDINITIS, UNSPECIFIED LATERALITY: ICD-10-CM

## 2024-07-17 DIAGNOSIS — M25.572 LEFT ANKLE PAIN, UNSPECIFIED CHRONICITY: ICD-10-CM

## 2024-07-17 PROCEDURE — 97035 APP MDLTY 1+ULTRASOUND EA 15: CPT | Mod: GP | Performed by: PHYSICAL THERAPIST

## 2024-07-17 PROCEDURE — 97110 THERAPEUTIC EXERCISES: CPT | Mod: GP | Performed by: PHYSICAL THERAPIST

## 2024-07-17 ASSESSMENT — PAIN SCALES - GENERAL: PAINLEVEL_OUTOF10: 2

## 2024-07-17 ASSESSMENT — PAIN - FUNCTIONAL ASSESSMENT: PAIN_FUNCTIONAL_ASSESSMENT: 0-10

## 2024-07-17 NOTE — PROGRESS NOTES
"Physical Therapy    Physical Therapy Treatment    Patient Name: Hugh Ruano  MRN: [id number]  Today's Date: 7/17/2024  Time Calculation  Start Time: 1315     PT Therapeutic Procedures Time Entry  Manual Therapy Time Entry: 5  Therapeutic Exercise Time Entry: 30  PT Modalities Time Entry  Ultrasound Time Entry: 10              Payor: MEDICARE / Plan: MEDICARE PART A AND B / Product Type: *No Product type* /     Reason for Referral: L ankle peroneal tendonitis  Referred By: Juan M  General Comment: Visit 2 of [place]    Current Problem  Problem List Items Addressed This Visit             ICD-10-CM    Left ankle pain - Primary M25.572    Peroneal tendinitis M76.70     Subjective   Pt overall notes no change in pain. Notes doing ok with the HEP.   Compliance with HEP-yes    Precautions  Precautions  STEADI Fall Risk Score (The score of 4 or more indicates an increased risk of falling): no change  Precautions Comment: none    Pain  Pain Assessment: 0-10  0-10 (Numeric) Pain Score: 2    Objective   No measures today     Treatments:  There-ex:   Nustep x 5 min   Calf incline stretch x 1 min   Soleus incline stretch x 1 min   1/2 foam calf raises 3 way 10 x ea   1/2 foam toe raises 2 x 10  6\" step up 2 x 10 L LE  6\" lateral step up 2 x 10 L LE  4 way ankle t-band GTB 2 x 10 ea  S/L ankle evr-add next visit   S/L inversion-add next visit     Modalities :   US continuous 3 mhz x 1.0 w/cm2 x 10 min to the L lateral ankle and peroneals    Manual Tx: x 5 min   IASTM to L peroneal tendons     Informed consent given on manual treatment. Pt given opportunity to cease treatment at any time. Educated pt on expected soreness, possible ecchymosis. Pt voiced understanding  No adverse effects were noted post tx.      Assessment:   Pt overall tolerated treatment well today with no difficulty with exercises performed. Reviewed exercise program recommended to continue with current program at home. .    Plan:   Progress gait/strength and " improve ROM as tolerated.     Goals:  Active       PT Problem       Reduce pain at worst to 0/10 with all prior activity.        Start:  07/15/24    Expected End:  08/04/24            Pt to increase L ankle strength to grossly 5/5 for improved gait, stairs, lifting and ADL's.        Start:  07/15/24    Expected End:  08/26/24            Pt to increase L ankle AROM all ranges equal the  R for gait and ADL's/hiking with no issues.        Start:  07/15/24    Expected End:  08/26/24            Pt to score 80/80 points on LEFS to display overall increased function.         Start:  07/15/24    Expected End:  08/26/24            Pt to be independent with a HEP for self management.        Start:  07/15/24    Expected End:  08/26/24

## 2024-07-24 ENCOUNTER — APPOINTMENT (OUTPATIENT)
Dept: PHYSICAL THERAPY | Facility: CLINIC | Age: 74
End: 2024-07-24
Payer: MEDICARE

## 2024-07-26 DIAGNOSIS — N40.0 BPH WITHOUT URINARY OBSTRUCTION: ICD-10-CM

## 2024-07-26 RX ORDER — FINASTERIDE 5 MG/1
5 TABLET, FILM COATED ORAL DAILY
Qty: 90 TABLET | Refills: 3 | Status: SHIPPED | OUTPATIENT
Start: 2024-07-26

## 2024-07-31 ENCOUNTER — APPOINTMENT (OUTPATIENT)
Dept: PHYSICAL THERAPY | Facility: CLINIC | Age: 74
End: 2024-07-31
Payer: MEDICARE

## 2024-07-31 ENCOUNTER — TREATMENT (OUTPATIENT)
Dept: PHYSICAL THERAPY | Facility: CLINIC | Age: 74
End: 2024-07-31
Payer: MEDICARE

## 2024-07-31 DIAGNOSIS — M76.70 PERONEAL TENDINITIS, UNSPECIFIED LATERALITY: ICD-10-CM

## 2024-07-31 DIAGNOSIS — M25.572 LEFT ANKLE PAIN: Primary | ICD-10-CM

## 2024-07-31 PROCEDURE — 97110 THERAPEUTIC EXERCISES: CPT | Mod: GP | Performed by: PHYSICAL THERAPIST

## 2024-07-31 PROCEDURE — 97035 APP MDLTY 1+ULTRASOUND EA 15: CPT | Mod: GP | Performed by: PHYSICAL THERAPIST

## 2024-07-31 ASSESSMENT — PAIN SCALES - GENERAL: PAINLEVEL_OUTOF10: 1

## 2024-07-31 ASSESSMENT — PAIN - FUNCTIONAL ASSESSMENT: PAIN_FUNCTIONAL_ASSESSMENT: 0-10

## 2024-07-31 NOTE — PROGRESS NOTES
"Physical Therapy    Physical Therapy Treatment    Patient Name: Hugh Ruano  MRN: [id number]  Today's Date: 7/31/2024  Time Calculation  Start Time: 1315  Stop Time: 1400  Time Calculation (min): 45 min     PT Therapeutic Procedures Time Entry  Manual Therapy Time Entry: 5  Therapeutic Exercise Time Entry: 30  PT Modalities Time Entry  Ultrasound Time Entry: 10              Payor: MEDICARE / Plan: MEDICARE PART A AND B / Product Type: *No Product type* /     Reason for Referral: L ankle peroneal tendonitis  Referred By: Juan M  General Comment: Visit 3 of [place]    Current Problem    Problem List Items Addressed This Visit             ICD-10-CM    Left ankle pain - Primary M25.572    Peroneal tendinitis M76.70      Subjective   Pt notes he felt better after last treatment with the US and IASTM.   Compliance with HEP-yes    Precautions  Precautions  STEADI Fall Risk Score (The score of 4 or more indicates an increased risk of falling): no change  Precautions Comment: none    Pain  Pain Assessment: 0-10  0-10 (Numeric) Pain Score: 1    Objective   No measures     Treatments:  There-ex:   Nustep x 5 min   Calf incline stretch x 1 min   Soleus incline stretch x 1 min   1/2 foam calf raises 3 way 10 x ea   1/2 foam toe raises 2 x 10  6\" step up 2 x 10 L LE  6\" lateral step up 2 x 10 L LE  4 way ankle t-band GTB 2 x 10 ea  S/L ankle evr 2# 2 x 10  S/L inversion 2# 2 x 10      Modalities :   US continuous 3 mhz x 1.0 w/cm2 x 10 min to the L lateral ankle and peroneals    Manual Tx: x 5 min   IASTM to L peroneal tendons     Informed consent given on manual treatment. Pt given opportunity to cease treatment at any time. Educated pt on expected soreness, possible ecchymosis. Pt voiced understanding  No adverse effects were noted post tx.      Assessment:   Pt overall tolerated treatment well with a good reduction pain thus far with his ankle. Continues to progress with program with added strengthening today with no issues. "     Plan:   Progress with WB activity and stability as tolerated.     Goals:  Active       PT Problem       Reduce pain at worst to 0/10 with all prior activity.        Start:  07/15/24    Expected End:  08/04/24            Pt to increase L ankle strength to grossly 5/5 for improved gait, stairs, lifting and ADL's.        Start:  07/15/24    Expected End:  08/26/24            Pt to increase L ankle AROM all ranges equal the  R for gait and ADL's/hiking with no issues.        Start:  07/15/24    Expected End:  08/26/24            Pt to score 80/80 points on LEFS to display overall increased function.         Start:  07/15/24    Expected End:  08/26/24            Pt to be independent with a HEP for self management.        Start:  07/15/24    Expected End:  08/26/24

## 2024-08-07 ENCOUNTER — TREATMENT (OUTPATIENT)
Dept: PHYSICAL THERAPY | Facility: CLINIC | Age: 74
End: 2024-08-07
Payer: MEDICARE

## 2024-08-07 DIAGNOSIS — M25.572 LEFT ANKLE PAIN, UNSPECIFIED CHRONICITY: Primary | ICD-10-CM

## 2024-08-07 DIAGNOSIS — M76.70 PERONEAL TENDINITIS, UNSPECIFIED LATERALITY: ICD-10-CM

## 2024-08-07 DIAGNOSIS — M25.572 LEFT ANKLE PAIN: ICD-10-CM

## 2024-08-07 PROCEDURE — 97110 THERAPEUTIC EXERCISES: CPT | Mod: GP,CQ

## 2024-08-07 PROCEDURE — 97140 MANUAL THERAPY 1/> REGIONS: CPT | Mod: GP,CQ

## 2024-08-07 ASSESSMENT — PAIN SCALES - GENERAL: PAINLEVEL_OUTOF10: 0 - NO PAIN

## 2024-08-07 ASSESSMENT — PAIN - FUNCTIONAL ASSESSMENT: PAIN_FUNCTIONAL_ASSESSMENT: 0-10

## 2024-08-07 NOTE — PROGRESS NOTES
"Physical Therapy    Physical Therapy Treatment    Patient Name: Hugh Runao  MRN: [id number]  Today's Date: 8/7/2024  Time Calculation  Start Time: 1400  Stop Time: 1445  Time Calculation (min): 45 min     PT Therapeutic Procedures Time Entry  Manual Therapy Time Entry: 10  Therapeutic Exercise Time Entry: 25  PT Modalities Time Entry  Ultrasound Time Entry: 10              Payor: MEDICARE / Plan: MEDICARE PART A AND B / Product Type: *No Product type* /     Reason for Referral: L ankle peroneal tendonitis  Referred By: Juan M Payne Comment: Visit 4 [place]    Current Problem    Problem List Items Addressed This Visit             ICD-10-CM    Left ankle pain - Primary M25.572    Peroneal tendinitis M76.70        Subjective   Pt reports he is feeling better. Has not had pain recently. Walks a few miles about every other day.   Would like to be able to walk up to 3-5 miles.   Compliance with HEP-yes    Precautions  Precautions  Precautions Comment: none    Pain  Pain Assessment: 0-10  0-10 (Numeric) Pain Score: 0 - No pain    Objective   No measures     Treatments:  There-ex:   Nustep x 5 min L4   Calf incline stretch x 1 min   Soleus incline stretch x 1 min   1/2 foam calf raises 3 way 10 x ea   1/2 foam toe raises 2 x 10  6\" step up 2 x 10 L LE finger tip balance  6\" lateral step up 2 x 10 L LE  S/L ankle evr 2# 2 x 10  S/L inversion 2# 2 x 10      AE tandem x1 min BL     Modalities :   US continuous 3 mhz x 1.0 w/cm2 x 10 min to the L lateral ankle and peroneals    Manual Tx:   IASTM to L peroneal tendons     Informed consent given on manual treatment. Pt given opportunity to cease treatment at any time. Educated pt on expected soreness, possible ecchymosis. Pt voiced understanding  No adverse effects were noted post tx.      Assessment:   Pt tolerated tx well without complaints of pain. Encouraged less UE support with step exercises. Pt voiced some upper back pain with step up exercises but pain diminished " once cued to stand up straight vs leaning fwd. Responds well to manual and US.     Plan:   Progress with WB activity and stability as tolerated.     HEP:  Access Code: [email or web address]  URL: [email or web address]  Date: 07/15/2024  Prepared by: RICKIE Moore    Exercises  - Long Sitting Calf Stretch with Strap  - 1 x daily - 7 x weekly - 1 sets - 3 reps - 30 sec hold  - Gastroc Stretch on Wall  - 1 x daily - 7 x weekly - 1 sets - 3 reps - 30 sec hold  - Seated Ankle Plantarflexion Dorsiflexion PROM  - 1 x daily - 7 x weekly - 1 sets - 3 reps - 30 sec hold  - Seated Ankle Eversion with Resistance  - 1 x daily - 7 x weekly - 2 sets - 10 reps  - Seated Ankle Dorsiflexion with Resistance  - 1 x daily - 7 x weekly - 2 sets - 10 reps  - Seated Ankle Plantar Flexion with Resistance Loop (Mirrored)  - 1 x daily - 7 x weekly - 2 sets - 10 reps  - Seated Ankle Dorsiflexion with Resistance (Mirrored)  - 1 x daily - 7 x weekly - 3 sets - 10 reps  Goals:  Active       PT Problem       Reduce pain at worst to 0/10 with all prior activity.        Start:  07/15/24    Expected End:  08/04/24            Pt to increase L ankle strength to grossly 5/5 for improved gait, stairs, lifting and ADL's.        Start:  07/15/24    Expected End:  08/26/24            Pt to increase L ankle AROM all ranges equal the  R for gait and ADL's/hiking with no issues.        Start:  07/15/24    Expected End:  08/26/24            Pt to score 80/80 points on LEFS to display overall increased function.         Start:  07/15/24    Expected End:  08/26/24            Pt to be independent with a HEP for self management.        Start:  07/15/24    Expected End:  08/26/24

## 2024-08-12 ENCOUNTER — TREATMENT (OUTPATIENT)
Dept: PHYSICAL THERAPY | Facility: CLINIC | Age: 74
End: 2024-08-12
Payer: MEDICARE

## 2024-08-12 DIAGNOSIS — M25.572 LEFT ANKLE PAIN: Primary | ICD-10-CM

## 2024-08-12 DIAGNOSIS — M76.70 PERONEAL TENDINITIS, UNSPECIFIED LATERALITY: ICD-10-CM

## 2024-08-12 PROCEDURE — 97140 MANUAL THERAPY 1/> REGIONS: CPT | Mod: GP,CQ

## 2024-08-12 PROCEDURE — 97110 THERAPEUTIC EXERCISES: CPT | Mod: GP,CQ

## 2024-08-12 ASSESSMENT — PAIN SCALES - GENERAL: PAINLEVEL_OUTOF10: 0 - NO PAIN

## 2024-08-12 ASSESSMENT — PAIN - FUNCTIONAL ASSESSMENT: PAIN_FUNCTIONAL_ASSESSMENT: 0-10

## 2024-08-12 NOTE — PROGRESS NOTES
"Physical Therapy    Physical Therapy Treatment    Patient Name: Hugh Ruano  MRN: [id number]  Today's Date: 8/12/2024  Time Calculation  Start Time: 1315  Stop Time: 1400  Time Calculation (min): 45 min     PT Therapeutic Procedures Time Entry  Manual Therapy Time Entry: 11  Neuromuscular Re-Education Time Entry: 4  Therapeutic Exercise Time Entry: 30                 Payor: MEDICARE / Plan: MEDICARE PART A AND B / Product Type: *No Product type* /     Reason for Referral: L ankle peroneal tendonitis  Referred By: Juan M  General Comment: Visit 5 of [place]    Current Problem    Problem List Items Addressed This Visit             ICD-10-CM    Left ankle pain - Primary M25.572    Peroneal tendinitis M76.70          Subjective   Pt reports he foot is doing well, no pain currently.     Compliance with HEP-yes    Precautions  Precautions  Precautions Comment: none    Pain  Pain Assessment: 0-10  0-10 (Numeric) Pain Score: 0 - No pain    Objective   No measures     Treatments:  There-ex:   Nustep x 5 min L4   Calf incline stretch x 1 min   Calf raise on EOS 2x10   1/2 foam toe raises 2 x 10  6\" step up  x 10, 8'' x10 L LE finger tip balance  8\" lateral step up 2 x 10 L LE  6'' Reverse step up 2x10  S/L ankle evr 2# 2 x 10  S/L inversion 2# 2 x 10      Neuro:  AE tandem x1 min BL   AE marches x 1 min no UE support  AE step up with knee drive x10 ea     Modalities :   held d/t focus on therex/manual  US continuous 3 mhz x 1.0 w/cm2 x 10 min to the L lateral ankle and peroneals    Manual Tx:   IA/STM to L peroneal tendons  Stretching to gastroc/soleus, plantar fascia     Informed consent given on manual treatment. Pt given opportunity to cease treatment at any time. Educated pt on expected soreness, possible ecchymosis. Pt voiced understanding  No adverse effects were noted post tx.      Assessment:   Pt tolerated exercise progression well without complaints of pain, just fatigue.  Improved balance with AE activities. "   Decreased tenderness along peroneals this visit compared to last.     Plan:   Progress with WB activity and stability as tolerated.     HEP:  Access Code: [email or web address]  URL: [email or web address]  Date: 07/15/2024  Prepared by: RICKIE Moore    Exercises  - Long Sitting Calf Stretch with Strap  - 1 x daily - 7 x weekly - 1 sets - 3 reps - 30 sec hold  - Gastroc Stretch on Wall  - 1 x daily - 7 x weekly - 1 sets - 3 reps - 30 sec hold  - Seated Ankle Plantarflexion Dorsiflexion PROM  - 1 x daily - 7 x weekly - 1 sets - 3 reps - 30 sec hold  - Seated Ankle Eversion with Resistance  - 1 x daily - 7 x weekly - 2 sets - 10 reps  - Seated Ankle Dorsiflexion with Resistance  - 1 x daily - 7 x weekly - 2 sets - 10 reps  - Seated Ankle Plantar Flexion with Resistance Loop (Mirrored)  - 1 x daily - 7 x weekly - 2 sets - 10 reps  - Seated Ankle Dorsiflexion with Resistance (Mirrored)  - 1 x daily - 7 x weekly - 3 sets - 10 reps  Goals:  Active       PT Problem       Reduce pain at worst to 0/10 with all prior activity.        Start:  07/15/24    Expected End:  08/04/24            Pt to increase L ankle strength to grossly 5/5 for improved gait, stairs, lifting and ADL's.        Start:  07/15/24    Expected End:  08/26/24            Pt to increase L ankle AROM all ranges equal the  R for gait and ADL's/hiking with no issues.        Start:  07/15/24    Expected End:  08/26/24            Pt to score 80/80 points on LEFS to display overall increased function.         Start:  07/15/24    Expected End:  08/26/24            Pt to be independent with a HEP for self management.        Start:  07/15/24    Expected End:  08/26/24

## 2024-08-14 ENCOUNTER — TREATMENT (OUTPATIENT)
Dept: PHYSICAL THERAPY | Facility: CLINIC | Age: 74
End: 2024-08-14
Payer: MEDICARE

## 2024-08-14 DIAGNOSIS — M25.572 LEFT ANKLE PAIN: Primary | ICD-10-CM

## 2024-08-14 DIAGNOSIS — M76.70 PERONEAL TENDINITIS, UNSPECIFIED LATERALITY: ICD-10-CM

## 2024-08-14 PROCEDURE — 97140 MANUAL THERAPY 1/> REGIONS: CPT | Mod: GP | Performed by: PHYSICAL THERAPIST

## 2024-08-14 PROCEDURE — 97110 THERAPEUTIC EXERCISES: CPT | Mod: GP | Performed by: PHYSICAL THERAPIST

## 2024-08-14 ASSESSMENT — PAIN - FUNCTIONAL ASSESSMENT: PAIN_FUNCTIONAL_ASSESSMENT: 0-10

## 2024-08-14 ASSESSMENT — PAIN SCALES - GENERAL: PAINLEVEL_OUTOF10: 0 - NO PAIN

## 2024-08-14 NOTE — PROGRESS NOTES
"Physical Therapy    Physical Therapy Treatment    Patient Name: Hugh Ruano  MRN: [id number]  Today's Date: 8/14/2024  Time Calculation  Start Time: 1315  Stop Time: 1400  Time Calculation (min): 45 min     PT Therapeutic Procedures Time Entry  Manual Therapy Time Entry: 10  Therapeutic Exercise Time Entry: 35                 Payor: MEDICARE / Plan: MEDICARE PART A AND B / Product Type: *No Product type* /     Reason for Referral: L ankle peroneal tendonitis  Referred By: Juan M  General Comment: Visit 6 of [place]    Current Problem    Problem List Items Addressed This Visit             ICD-10-CM    Left ankle pain - Primary M25.572    Peroneal tendinitis M76.70      Subjective   Pt overall notes minimal pain today and feels he is continually getting better.   Compliance with HEP-yes    Precautions  Precautions  Precautions Comment: none    Pain  Pain Assessment: 0-10  0-10 (Numeric) Pain Score: 0 - No pain    Objective   No measures today     Treatments:  There-ex:   Nustep x 5 min L4   Calf incline stretch x 1 min   Pro stretch PF x 1 min L ankle   Calf raise on EOS 2x10   1/2 foam toe raises 2 x 10  6\" step up  x 10, 8'' x10 L LE finger tip balance  8\" lateral step up 2 x 10 L LE  6'' Reverse step up 2x10  S/L ankle evr 2# 2 x 10  S/L inversion 2# 2 x 10      Neuro:  AE tandem x1 min BL   AE marches x 1 min no UE support  AE step up with knee drive x10 ea   Airex lateral up and over 10x R/L   Airex SLS L LE hip abd/ext 10 x ea     Manual Tx:   IA/STM to L peroneal tendons  Stretching to gastroc/soleus, plantar fascia     Informed consent given on manual treatment. Pt given opportunity to cease treatment at any time. Educated pt on expected soreness, possible ecchymosis. Pt voiced understanding  No adverse effects were noted post tx.      Assessment:   Pt overall tolerated treatment well and is progressing well with his ankle. Is noted with some stability issues and balance loss with SLS and stepping onto airex " with L LE but was able to complete activities.     Plan:   2 more visit sthen discharge if doing well.     Goals:  Active       PT Problem       Reduce pain at worst to 0/10 with all prior activity.        Start:  07/15/24    Expected End:  08/04/24            Pt to increase L ankle strength to grossly 5/5 for improved gait, stairs, lifting and ADL's.        Start:  07/15/24    Expected End:  08/26/24            Pt to increase L ankle AROM all ranges equal the  R for gait and ADL's/hiking with no issues.        Start:  07/15/24    Expected End:  08/26/24            Pt to score 80/80 points on LEFS to display overall increased function.         Start:  07/15/24    Expected End:  08/26/24            Pt to be independent with a HEP for self management.        Start:  07/15/24    Expected End:  08/26/24

## 2024-08-19 ENCOUNTER — TREATMENT (OUTPATIENT)
Dept: PHYSICAL THERAPY | Facility: CLINIC | Age: 74
End: 2024-08-19
Payer: MEDICARE

## 2024-08-19 DIAGNOSIS — M76.70 PERONEAL TENDINITIS, UNSPECIFIED LATERALITY: ICD-10-CM

## 2024-08-19 DIAGNOSIS — M25.572 LEFT ANKLE PAIN: Primary | ICD-10-CM

## 2024-08-19 PROCEDURE — 97112 NEUROMUSCULAR REEDUCATION: CPT | Mod: GP | Performed by: PHYSICAL THERAPIST

## 2024-08-19 PROCEDURE — 97110 THERAPEUTIC EXERCISES: CPT | Mod: GP | Performed by: PHYSICAL THERAPIST

## 2024-08-19 ASSESSMENT — PAIN SCALES - GENERAL: PAINLEVEL_OUTOF10: 0 - NO PAIN

## 2024-08-19 NOTE — PROGRESS NOTES
"Physical Therapy    Physical Therapy Treatment    Patient Name: Hugh Ruano  MRN: [id number]  Today's Date: 8/19/2024  Time Calculation  Start Time: 1315  Stop Time: 1400  Time Calculation (min): 45 min     PT Therapeutic Procedures Time Entry  Manual Therapy Time Entry: 10  Neuromuscular Re-Education Time Entry: 10  Therapeutic Exercise Time Entry: 25                 Payor: MEDICARE / Plan: MEDICARE PART A AND B / Product Type: *No Product type* /     Reason for Referral: L ankle peroneal tendonitis  Referred By: Juan M  General Comment: Visit 7 of [place]    Current Problem    Problem List Items Addressed This Visit             ICD-10-CM    Left ankle pain - Primary M25.572    Peroneal tendinitis M76.70        Subjective   Pt overall notes he is doing well and feel she is progressing with currently no pain in the ankle.   Compliance with HEP-yes    Precautions  Precautions  Precautions Comment: none    Pain  0-10 (Numeric) Pain Score: 0 - No pain    Objective   No measures     Treatments:  There-ex:   Nustep x 5 min L4   Calf incline stretch x 1 min   Pro stretch PF x 1 min L ankle   Calf raise on EOS 2x10   1/2 foam toe raises 2 x 10  8'' step up 2 x 10 L LE finger tip balance  8\" lateral step up 2 x 10 L LE  6'' Reverse step up 2x10  S/L ankle evr 2# 2 x 10  S/L inversion 2# 2 x 10      Neuro:  AE tandem x1 min BL   AE marches x 1 min no UE support  AE step up with knee drive x10 ea   Airex lateral up and over 10x R/L   Airex SLS L LE hip abd/ext 10 x ea     Manual Tx:   IA/STM to L peroneal tendons  Stretching to gastroc/soleus, plantar fascia     Informed consent given on manual treatment. Pt given opportunity to cease treatment at any time. Educated pt on expected soreness, possible ecchymosis. Pt voiced understanding  No adverse effects were noted post tx.      Assessment:   Pt overall tolerated treatment well and is doing well with no pain with weight bearing activity. Improved well with overall pain level " and has met his pain goal.     Plan:   Re-check and discharge to HEP next visit.     Goals:  Active       PT Problem       Reduce pain at worst to 0/10 with all prior activity.  (Met)       Start:  07/15/24    Expected End:  08/04/24    Resolved:  08/19/24         Pt to increase L ankle strength to grossly 5/5 for improved gait, stairs, lifting and ADL's.        Start:  07/15/24    Expected End:  08/26/24            Pt to increase L ankle AROM all ranges equal the  R for gait and ADL's/hiking with no issues.        Start:  07/15/24    Expected End:  08/26/24            Pt to score 80/80 points on LEFS to display overall increased function.         Start:  07/15/24    Expected End:  08/26/24            Pt to be independent with a HEP for self management.        Start:  07/15/24    Expected End:  08/26/24

## 2024-08-21 ENCOUNTER — TREATMENT (OUTPATIENT)
Dept: PHYSICAL THERAPY | Facility: CLINIC | Age: 74
End: 2024-08-21
Payer: MEDICARE

## 2024-08-21 DIAGNOSIS — M25.572 LEFT ANKLE PAIN: Primary | ICD-10-CM

## 2024-08-21 DIAGNOSIS — M76.70 PERONEAL TENDINITIS, UNSPECIFIED LATERALITY: ICD-10-CM

## 2024-08-21 PROCEDURE — 97140 MANUAL THERAPY 1/> REGIONS: CPT | Mod: GP | Performed by: PHYSICAL THERAPIST

## 2024-08-21 PROCEDURE — 97110 THERAPEUTIC EXERCISES: CPT | Mod: GP | Performed by: PHYSICAL THERAPIST

## 2024-08-21 ASSESSMENT — PAIN SCALES - GENERAL: PAINLEVEL_OUTOF10: 0 - NO PAIN

## 2024-08-21 NOTE — PROGRESS NOTES
"Physical Therapy    Physical Therapy Treatment/Discharge     Patient Name: Hugh Ruano  MRN: [id number]  Today's Date: 8/21/2024  Time Calculation  Start Time: 1315  Stop Time: 1400  Time Calculation (min): 45 min     PT Therapeutic Procedures Time Entry  Manual Therapy Time Entry: 10  Therapeutic Exercise Time Entry: 35                 Payor: MEDICARE / Plan: MEDICARE PART A AND B / Product Type: *No Product type* /     Reason for Referral: L ankle peroneal tendonitis  Referred By: Juan M  General Comment: Visit 8 of [place]    Current Problem    Problem List Items Addressed This Visit             ICD-10-CM    Left ankle pain - Primary M25.572    Peroneal tendinitis M76.70        Subjective   Pt overall notes he feels his ankle is doing well today.   Compliance with HEP-yes    Precautions  Precautions  Precautions Comment: none    Pain  0-10 (Numeric) Pain Score: 0 - No pain      Objective   Lower Extremity Strength:  MMT 5/5 max  RIGHT LEFT   Ankle DF 5 5   Ankle PF 5 5   Ankle INV 5 5   Ankle EV 5 5     Lower Extremity ROM: WNL unless documented below:  ROM in Degrees  RIGHT LEFT   Ankle DF 10 10   Ankle PF 60 59   Ankle INV 30 32   Ankle EV 5 10     Other Measures  Lower Extremity Funtional Score (LEFS): 80/80     Treatments:  There-ex:   Nustep x 5 min L4   Calf incline stretch x 1 min   Pro stretch PF x 1 min L ankle   Calf raise on EOS 2x10   1/2 foam toe raises 2 x 10  8'' step up 2 x 10 L LE finger tip balance  8\" lateral step up 2 x 10 L LE  6'' Reverse step up 2x10  S/L ankle evr 2# 2 x 10  S/L inversion 2# 2 x 10      Neuro:  AE tandem x1 min BL   AE marches x 1 min no UE support  AE step up with knee drive x10 ea   Airex lateral up and over 10x R/L   Airex SLS L LE hip abd/ext 10 x ea     Manual Tx:   IA/STM to L peroneal tendons  Stretching to gastroc/soleus, plantar fascia     Informed consent given on manual treatment. Pt given opportunity to cease treatment at any time. Educated pt on expected " soreness, possible ecchymosis. Pt voiced understanding  No adverse effects were noted post tx.      Manual Tx:    Informed consent given on manual treatment. Pt given opportunity to cease treatment at any time. Educated pt on expected soreness, possible ecchymosis. Pt voiced understanding  No adverse effects were noted post tx.      Current HEP :  Access Code: [email or web address]  URL: [email or web address]  Date: 08/21/2024  Prepared by: RICKIE Moore    Exercises  - Long Sitting Calf Stretch with Strap  - 1 x daily - 7 x weekly - 1 sets - 3 reps - 30 sec hold  - Gastroc Stretch on Wall  - 1 x daily - 7 x weekly - 1 sets - 3 reps - 30 sec hold  - Seated Ankle Plantarflexion Dorsiflexion PROM  - 1 x daily - 7 x weekly - 1 sets - 3 reps - 30 sec hold  - Seated Ankle Eversion with Resistance  - 1 x daily - 4 x weekly - 2 sets - 10 reps  - Seated Ankle Dorsiflexion with Resistance  - 1 x daily - 4 x weekly - 2 sets - 10 reps  - Seated Ankle Plantar Flexion with Resistance Loop (Mirrored)  - 1 x daily - 4 x weekly - 2 sets - 10 reps  - Seated Ankle Dorsiflexion with Resistance (Mirrored)  - 1 x daily - 4 x weekly - 3 sets - 10 reps  - Step Up  - 1 x daily - 4 x weekly - 2 sets - 10 reps  - Lateral Step Up (Mirrored)  - 1 x daily - 4 x weekly - 2 sets - 10 reps  - Backward Step Up (Mirrored)  - 1 x daily - 4 x weekly - 2 sets - 10 reps  - Single Leg Stance  - 1 x daily - 4 x weekly - 1 sets - 3 reps - 20 sec hold  - Tandem Stance  - 1 x daily - 4 x weekly - 1 sets - 3 reps - 30 sec hold    Assessment:   Pt overall noted with improved strength and ROM in the L ankle with no significant periods of pain at this time. Pt has progressed well with his balance and gait. Pt has met his LTG's (100%) at this time and is recommended to discharge to his HEP. Pt has a good prognosis going forward with his HEP performance. .     Plan:   Discharge to HEP.     Goals:  Resolved       PT Problem       Reduce pain at worst to  0/10 with all prior activity.  (Met)       Start:  07/15/24    Expected End:  08/04/24    Resolved:  08/19/24         Pt to increase L ankle strength to grossly 5/5 for improved gait, stairs, lifting and ADL's.  (Met)       Start:  07/15/24    Expected End:  08/26/24    Resolved:  08/21/24         Pt to increase L ankle AROM all ranges equal the  R for gait and ADL's/hiking with no issues.  (Met)       Start:  07/15/24    Expected End:  08/26/24    Resolved:  08/21/24         Pt to score 80/80 points on LEFS to display overall increased function.   (Met)       Start:  07/15/24    Expected End:  08/26/24    Resolved:  08/21/24         Pt to be independent with a HEP for self management.  (Met)       Start:  07/15/24    Expected End:  08/26/24    Resolved:  08/21/24

## 2024-09-12 ENCOUNTER — APPOINTMENT (OUTPATIENT)
Dept: PRIMARY CARE | Facility: CLINIC | Age: 74
End: 2024-09-12
Payer: MEDICARE

## 2024-09-18 ENCOUNTER — APPOINTMENT (OUTPATIENT)
Dept: PRIMARY CARE | Facility: CLINIC | Age: 74
End: 2024-09-18
Payer: MEDICARE

## 2024-09-18 VITALS
WEIGHT: 228.4 LBS | HEIGHT: 66 IN | SYSTOLIC BLOOD PRESSURE: 138 MMHG | HEART RATE: 62 BPM | RESPIRATION RATE: 18 BRPM | TEMPERATURE: 97.7 F | BODY MASS INDEX: 36.71 KG/M2 | DIASTOLIC BLOOD PRESSURE: 82 MMHG | OXYGEN SATURATION: 95 %

## 2024-09-18 DIAGNOSIS — H25.093 AGE-RELATED INCIPIENT CATARACT OF BOTH EYES: ICD-10-CM

## 2024-09-18 DIAGNOSIS — Z01.818 PREOPERATIVE CLEARANCE: Primary | ICD-10-CM

## 2024-09-18 DIAGNOSIS — Z23 NEED FOR INFLUENZA VACCINATION: ICD-10-CM

## 2024-09-18 PROCEDURE — 1036F TOBACCO NON-USER: CPT | Performed by: INTERNAL MEDICINE

## 2024-09-18 PROCEDURE — 3075F SYST BP GE 130 - 139MM HG: CPT | Performed by: INTERNAL MEDICINE

## 2024-09-18 PROCEDURE — 3008F BODY MASS INDEX DOCD: CPT | Performed by: INTERNAL MEDICINE

## 2024-09-18 PROCEDURE — 90662 IIV NO PRSV INCREASED AG IM: CPT | Performed by: INTERNAL MEDICINE

## 2024-09-18 PROCEDURE — 1159F MED LIST DOCD IN RCRD: CPT | Performed by: INTERNAL MEDICINE

## 2024-09-18 PROCEDURE — 3079F DIAST BP 80-89 MM HG: CPT | Performed by: INTERNAL MEDICINE

## 2024-09-18 PROCEDURE — 99213 OFFICE O/P EST LOW 20 MIN: CPT | Performed by: INTERNAL MEDICINE

## 2024-09-18 PROCEDURE — G0008 ADMIN INFLUENZA VIRUS VAC: HCPCS | Performed by: INTERNAL MEDICINE

## 2024-09-18 PROCEDURE — 1123F ACP DISCUSS/DSCN MKR DOCD: CPT | Performed by: INTERNAL MEDICINE

## 2024-09-18 PROCEDURE — 1160F RVW MEDS BY RX/DR IN RCRD: CPT | Performed by: INTERNAL MEDICINE

## 2024-09-18 ASSESSMENT — ENCOUNTER SYMPTOMS
VOMITING: 0
PALPITATIONS: 0
UNEXPECTED WEIGHT CHANGE: 0
RECTAL PAIN: 0
FEVER: 0
BLOOD IN STOOL: 0
CONSTIPATION: 0
COUGH: 0
ABDOMINAL PAIN: 0
APPETITE CHANGE: 0
CHILLS: 0
DIFFICULTY URINATING: 0
HEADACHES: 0
DIZZINESS: 0
FATIGUE: 0
LIGHT-HEADEDNESS: 0
ACTIVITY CHANGE: 0
SHORTNESS OF BREATH: 0
CHEST TIGHTNESS: 0
WHEEZING: 0
NAUSEA: 0
DIARRHEA: 0

## 2024-09-18 ASSESSMENT — PATIENT HEALTH QUESTIONNAIRE - PHQ9
SUM OF ALL RESPONSES TO PHQ9 QUESTIONS 1 AND 2: 0
2. FEELING DOWN, DEPRESSED OR HOPELESS: NOT AT ALL
1. LITTLE INTEREST OR PLEASURE IN DOING THINGS: NOT AT ALL

## 2024-09-18 NOTE — PATIENT INSTRUCTIONS
You got your high dose flu shot today; consider covid booster in next few weeks/months  You are medically cleared to have your cataract surgery   Follow up with Dr. Bermuedz as directed

## 2024-09-18 NOTE — PROGRESS NOTES
"Subjective   Patient ID: Hugh Saucedadneff \"Lashay" is a 73 y.o. male who presents for Follow-up (Pat ).    HPI  Pt here for PAT for cataract removal by Dr. Bermudez.  He is going to have both done.  The first eye (left) is scheduled for 9/26 and the right 10/10.  He is not having any vision issues.      He is taking his medications as directed.  He takes his BP at home every now and then and normally 120's/70's.  He is feeling well without any issues.          Review of Systems   Constitutional:  Negative for activity change, appetite change, chills, fatigue, fever and unexpected weight change.   Respiratory:  Negative for cough, chest tightness, shortness of breath and wheezing.    Cardiovascular:  Negative for chest pain, palpitations and leg swelling.   Gastrointestinal:  Negative for abdominal pain, blood in stool, constipation, diarrhea, nausea, rectal pain and vomiting.   Genitourinary:  Negative for difficulty urinating.   Neurological:  Negative for dizziness, light-headedness and headaches.       Objective   /82 (BP Location: Left arm, Patient Position: Sitting)   Pulse 62   Temp 36.5 °C (97.7 °F)   Resp 18   Ht 1.67 m (5' 5.75\")   Wt 104 kg (228 lb 6.4 oz)   SpO2 95%   BMI 37.15 kg/m²    Physical Exam  Constitutional:       Appearance: Normal appearance. He is obese.   Cardiovascular:      Rate and Rhythm: Normal rate and regular rhythm.      Heart sounds: Normal heart sounds.   Pulmonary:      Effort: Pulmonary effort is normal.      Breath sounds: Normal breath sounds.   Abdominal:      General: Bowel sounds are normal.      Palpations: Abdomen is soft.   Musculoskeletal:      Right lower leg: No edema.      Left lower leg: No edema.   Lymphadenopathy:      Cervical: No cervical adenopathy.   Neurological:      Mental Status: He is alert and oriented to person, place, and time.   Psychiatric:         Mood and Affect: Mood normal.         Assessment/Plan   Problem List Items Addressed This " Visit       Preoperative clearance - Primary     Pt medically cleared for bilateral cataract removal with lens by Dr. Bermudez  Form completed and will be faxed with copy of today's OV note  He did get his flu shot today (high dose)           Other Visit Diagnoses       Need for influenza vaccination        Relevant Orders    Flu vaccine, trivalent, preservative free, HIGH-DOSE, age 65y+ (Fluzone) (Completed)    Age-related incipient cataract of both eyes

## 2024-09-18 NOTE — ASSESSMENT & PLAN NOTE
Pt medically cleared for bilateral cataract removal with lens by Dr. Bermudez  Form completed and will be faxed with copy of today's OV note  He did get his flu shot today (high dose)

## 2024-12-10 ENCOUNTER — TELEPHONE (OUTPATIENT)
Dept: PRIMARY CARE | Facility: CLINIC | Age: 74
End: 2024-12-10

## 2024-12-10 ENCOUNTER — LAB (OUTPATIENT)
Dept: LAB | Facility: LAB | Age: 74
End: 2024-12-10
Payer: MEDICARE

## 2024-12-10 ENCOUNTER — APPOINTMENT (OUTPATIENT)
Dept: PRIMARY CARE | Facility: CLINIC | Age: 74
End: 2024-12-10
Payer: MEDICARE

## 2024-12-10 VITALS
WEIGHT: 222.7 LBS | OXYGEN SATURATION: 96 % | DIASTOLIC BLOOD PRESSURE: 77 MMHG | RESPIRATION RATE: 14 BRPM | HEART RATE: 58 BPM | BODY MASS INDEX: 35.79 KG/M2 | TEMPERATURE: 97.6 F | SYSTOLIC BLOOD PRESSURE: 125 MMHG | HEIGHT: 66 IN

## 2024-12-10 DIAGNOSIS — I10 HTN (HYPERTENSION), BENIGN: ICD-10-CM

## 2024-12-10 DIAGNOSIS — R80.9 PROTEINURIA, UNSPECIFIED TYPE: ICD-10-CM

## 2024-12-10 DIAGNOSIS — K21.9 GASTROESOPHAGEAL REFLUX DISEASE WITHOUT ESOPHAGITIS: ICD-10-CM

## 2024-12-10 DIAGNOSIS — E78.2 MIXED HYPERLIPIDEMIA: ICD-10-CM

## 2024-12-10 DIAGNOSIS — E66.812 CLASS 2 OBESITY WITH BODY MASS INDEX (BMI) OF 35 TO 39.9 WITHOUT COMORBIDITY: ICD-10-CM

## 2024-12-10 DIAGNOSIS — Z00.00 MEDICARE ANNUAL WELLNESS VISIT, SUBSEQUENT: Primary | ICD-10-CM

## 2024-12-10 DIAGNOSIS — N40.0 BPH WITHOUT URINARY OBSTRUCTION: ICD-10-CM

## 2024-12-10 LAB
ALBUMIN SERPL BCP-MCNC: 4.4 G/DL (ref 3.4–5)
ALP SERPL-CCNC: 75 U/L (ref 33–136)
ALT SERPL W P-5'-P-CCNC: 58 U/L (ref 10–52)
AMORPH CRY #/AREA UR COMP ASSIST: ABNORMAL /HPF
ANION GAP SERPL CALC-SCNC: 12 MMOL/L (ref 10–20)
APPEARANCE UR: ABNORMAL
AST SERPL W P-5'-P-CCNC: 27 U/L (ref 9–39)
BILIRUB SERPL-MCNC: 0.9 MG/DL (ref 0–1.2)
BILIRUB UR STRIP.AUTO-MCNC: NEGATIVE MG/DL
BUN SERPL-MCNC: 20 MG/DL (ref 6–23)
CALCIUM SERPL-MCNC: 9.4 MG/DL (ref 8.6–10.6)
CHLORIDE SERPL-SCNC: 107 MMOL/L (ref 98–107)
CHOLEST SERPL-MCNC: 152 MG/DL (ref 0–199)
CHOLESTEROL/HDL RATIO: 3.1
CO2 SERPL-SCNC: 25 MMOL/L (ref 21–32)
COLOR UR: ABNORMAL
CREAT SERPL-MCNC: 1.12 MG/DL (ref 0.5–1.3)
EGFRCR SERPLBLD CKD-EPI 2021: 69 ML/MIN/1.73M*2
ERYTHROCYTE [DISTWIDTH] IN BLOOD BY AUTOMATED COUNT: 11.9 % (ref 11.5–14.5)
GLUCOSE SERPL-MCNC: 105 MG/DL (ref 74–99)
GLUCOSE UR STRIP.AUTO-MCNC: NORMAL MG/DL
HCT VFR BLD AUTO: 45.6 % (ref 41–52)
HDLC SERPL-MCNC: 49.3 MG/DL
HGB BLD-MCNC: 15.3 G/DL (ref 13.5–17.5)
KETONES UR STRIP.AUTO-MCNC: NEGATIVE MG/DL
LDLC SERPL CALC-MCNC: 80 MG/DL
LEUKOCYTE ESTERASE UR QL STRIP.AUTO: NEGATIVE
MCH RBC QN AUTO: 29.1 PG (ref 26–34)
MCHC RBC AUTO-ENTMCNC: 33.6 G/DL (ref 32–36)
MCV RBC AUTO: 87 FL (ref 80–100)
MUCOUS THREADS #/AREA URNS AUTO: ABNORMAL /LPF
NITRITE UR QL STRIP.AUTO: NEGATIVE
NON HDL CHOLESTEROL: 103 MG/DL (ref 0–149)
NRBC BLD-RTO: 0 /100 WBCS (ref 0–0)
PH UR STRIP.AUTO: 5.5 [PH]
PLATELET # BLD AUTO: 281 X10*3/UL (ref 150–450)
POTASSIUM SERPL-SCNC: 4.1 MMOL/L (ref 3.5–5.3)
PROT SERPL-MCNC: 6.9 G/DL (ref 6.4–8.2)
PROT UR STRIP.AUTO-MCNC: ABNORMAL MG/DL
PSA SERPL-MCNC: 0.14 NG/ML
RBC # BLD AUTO: 5.26 X10*6/UL (ref 4.5–5.9)
RBC # UR STRIP.AUTO: NEGATIVE /UL
RBC #/AREA URNS AUTO: ABNORMAL /HPF
SODIUM SERPL-SCNC: 140 MMOL/L (ref 136–145)
SP GR UR STRIP.AUTO: 1.03
TRIGL SERPL-MCNC: 114 MG/DL (ref 0–149)
UROBILINOGEN UR STRIP.AUTO-MCNC: NORMAL MG/DL
VLDL: 23 MG/DL (ref 0–40)
WBC # BLD AUTO: 6.7 X10*3/UL (ref 4.4–11.3)
WBC #/AREA URNS AUTO: ABNORMAL /HPF

## 2024-12-10 PROCEDURE — 1036F TOBACCO NON-USER: CPT | Performed by: INTERNAL MEDICINE

## 2024-12-10 PROCEDURE — 3078F DIAST BP <80 MM HG: CPT | Performed by: INTERNAL MEDICINE

## 2024-12-10 PROCEDURE — 84153 ASSAY OF PSA TOTAL: CPT

## 2024-12-10 PROCEDURE — 1170F FXNL STATUS ASSESSED: CPT | Performed by: INTERNAL MEDICINE

## 2024-12-10 PROCEDURE — 1123F ACP DISCUSS/DSCN MKR DOCD: CPT | Performed by: INTERNAL MEDICINE

## 2024-12-10 PROCEDURE — 3008F BODY MASS INDEX DOCD: CPT | Performed by: INTERNAL MEDICINE

## 2024-12-10 PROCEDURE — 1159F MED LIST DOCD IN RCRD: CPT | Performed by: INTERNAL MEDICINE

## 2024-12-10 PROCEDURE — 1160F RVW MEDS BY RX/DR IN RCRD: CPT | Performed by: INTERNAL MEDICINE

## 2024-12-10 PROCEDURE — 80061 LIPID PANEL: CPT

## 2024-12-10 PROCEDURE — 85027 COMPLETE CBC AUTOMATED: CPT

## 2024-12-10 PROCEDURE — 1158F ADVNC CARE PLAN TLK DOCD: CPT | Performed by: INTERNAL MEDICINE

## 2024-12-10 PROCEDURE — 80053 COMPREHEN METABOLIC PANEL: CPT

## 2024-12-10 PROCEDURE — 81001 URINALYSIS AUTO W/SCOPE: CPT

## 2024-12-10 PROCEDURE — 99214 OFFICE O/P EST MOD 30 MIN: CPT | Performed by: INTERNAL MEDICINE

## 2024-12-10 PROCEDURE — G0439 PPPS, SUBSEQ VISIT: HCPCS | Performed by: INTERNAL MEDICINE

## 2024-12-10 PROCEDURE — 3074F SYST BP LT 130 MM HG: CPT | Performed by: INTERNAL MEDICINE

## 2024-12-10 PROCEDURE — 36415 COLL VENOUS BLD VENIPUNCTURE: CPT

## 2024-12-10 RX ORDER — OMEPRAZOLE 20 MG/1
20-40 CAPSULE, DELAYED RELEASE ORAL DAILY PRN
Qty: 90 CAPSULE | Refills: 1 | Status: SHIPPED | OUTPATIENT
Start: 2024-12-10 | End: 2026-11-30

## 2024-12-10 ASSESSMENT — ENCOUNTER SYMPTOMS
NECK PAIN: 0
CONFUSION: 0
APNEA: 0
POLYPHAGIA: 0
RHINORRHEA: 0
HYPERACTIVE: 0
EYE PAIN: 0
CONSTIPATION: 0
HEMATURIA: 0
FREQUENCY: 0
FACIAL ASYMMETRY: 0
ANAL BLEEDING: 0
STRIDOR: 0
DEPRESSION: 0
DECREASED CONCENTRATION: 0
BACK PAIN: 0
POLYDIPSIA: 0
SLEEP DISTURBANCE: 0
DIARRHEA: 0
BLOOD IN STOOL: 0
DIFFICULTY URINATING: 0
PALPITATIONS: 0
DYSURIA: 0
SHORTNESS OF BREATH: 0
UNEXPECTED WEIGHT CHANGE: 0
ABDOMINAL DISTENTION: 0
COUGH: 0
CHOKING: 0
NERVOUS/ANXIOUS: 0
SORE THROAT: 0
OCCASIONAL FEELINGS OF UNSTEADINESS: 0
DYSPHORIC MOOD: 0
VOMITING: 0
FLANK PAIN: 0
RECTAL PAIN: 0
EYE ITCHING: 0
EYE REDNESS: 0
ARTHRALGIAS: 1
TREMORS: 0
PHOTOPHOBIA: 0
HALLUCINATIONS: 0
EYE DISCHARGE: 0
BRUISES/BLEEDS EASILY: 0
SEIZURES: 0
SINUS PAIN: 0
WHEEZING: 0
ACTIVITY CHANGE: 0
MYALGIAS: 0
FACIAL SWELLING: 0
WEAKNESS: 0
LIGHT-HEADEDNESS: 0
TROUBLE SWALLOWING: 0
NUMBNESS: 0
LOSS OF SENSATION IN FEET: 0
VOICE CHANGE: 0
ABDOMINAL PAIN: 0
NAUSEA: 0
DIAPHORESIS: 0
APPETITE CHANGE: 0
COLOR CHANGE: 0
NECK STIFFNESS: 0
AGITATION: 0
FATIGUE: 0
ADENOPATHY: 0
SPEECH DIFFICULTY: 0
SINUS PRESSURE: 0
CHEST TIGHTNESS: 0
JOINT SWELLING: 0
WOUND: 0
HEADACHES: 0
CHILLS: 0
FEVER: 0
DIZZINESS: 0

## 2024-12-10 ASSESSMENT — ACTIVITIES OF DAILY LIVING (ADL)
BATHING: INDEPENDENT
DOING_HOUSEWORK: INDEPENDENT
TAKING_MEDICATION: INDEPENDENT
GROCERY_SHOPPING: INDEPENDENT
MANAGING_FINANCES: INDEPENDENT
DRESSING: INDEPENDENT

## 2024-12-10 ASSESSMENT — PATIENT HEALTH QUESTIONNAIRE - PHQ9
1. LITTLE INTEREST OR PLEASURE IN DOING THINGS: NOT AT ALL
2. FEELING DOWN, DEPRESSED OR HOPELESS: NOT AT ALL
SUM OF ALL RESPONSES TO PHQ9 QUESTIONS 1 AND 2: 0

## 2024-12-10 NOTE — ASSESSMENT & PLAN NOTE
On statin therapy  Repeat lipids  Encouraged weight loss    Orders:    Follow Up In Primary Care - Medicare Annual    Lipid Panel; Future

## 2024-12-10 NOTE — PATIENT INSTRUCTIONS
Get blood work and urine done today  Work hard on weight loss-healthy diet-lean protein/fish/veggies; lower carbs/sugar  Exercise with goal of 150 minutes/week of moderate activity  Continue all medications as directed  Consider RSV vaccine-1 time dose  Get a living will/DPOA of healthcare drawn up if not already done and bring us copy so we know your medical wishes   Follow up here in 1 year-sooner if needed

## 2024-12-10 NOTE — ASSESSMENT & PLAN NOTE
Repeat PSA  On Finasteride     Orders:    Follow Up In Primary Care - Medicare Annual    PSA; Future    Urinalysis with Reflex Microscopic; Future

## 2024-12-10 NOTE — ASSESSMENT & PLAN NOTE
Encouraged pt to work on weight loss-improvements in diet in particular  Exercise more regularly with goal of 150 minutes/week

## 2024-12-10 NOTE — TELEPHONE ENCOUNTER
Called and informed patient on results- patient understands and has no other questions or concerns at this time. Patient encouraged to reach us at the office if any come up.

## 2024-12-10 NOTE — ASSESSMENT & PLAN NOTE
Well controlled on current regimen     Orders:    Follow Up In Primary Care - Medicare Annual    Comprehensive Metabolic Panel; Future    CBC; Future    Urinalysis with Reflex Microscopic; Future

## 2024-12-10 NOTE — TELEPHONE ENCOUNTER
----- Message from Elvie Connor sent at 12/10/2024  4:14 PM EST -----  Pt with protein noted in urine-due to having blood pressure history, needs to work on weight loss as also noted liver enzymes that are a bit high-fatty liver  Weight loss will be very key to helping all this improve

## 2024-12-10 NOTE — PROGRESS NOTES
Subjective   Reason for Visit: Hugh Ruano is an 74 y.o. male here for a Medicare Wellness visit.     Past Medical, Surgical, and Family History reviewed and updated in chart.    Reviewed all medications by prescribing practitioner or clinical pharmacist (such as prescriptions, OTCs, herbal therapies and supplements) and documented in the medical record.    HPI  Pt here for MWV.  He has had his covid and flu boosters this fall.  He is up to date on pneumonia and shingrix.  He is exercising and admits to diet being fair.  He walks/takes hikes almost daily.      He is taking his medications without issues.      He had colonoscopy in 2021 and has polyps.  Repeat in 5 years.  No GI or bowel issues.  He uses Omeprazole before parties-he notes symptoms when he overeats.      He had his cataracts removed recently and had post op visit noted on 11/1.    Surgeries were noted for 9/26 and 10/10 by Dr. Bermudez.  He is seeing so much clearer.      He has some continues pain at times in the right ankle.      He had skin cancer on his nose-he had Mohs procedure in 11/2023.  No new skin issues.  He goes every 6 months to see derm.      Patient Care Team:  Elvie MELÉNDEZ DO as PCP - General     Review of Systems   Constitutional:  Negative for activity change, appetite change, chills, diaphoresis, fatigue, fever and unexpected weight change.   HENT:  Negative for congestion, dental problem, drooling, ear discharge, ear pain, facial swelling, hearing loss, mouth sores, nosebleeds, postnasal drip, rhinorrhea, sinus pressure, sinus pain, sneezing, sore throat, tinnitus, trouble swallowing and voice change.    Eyes:  Negative for photophobia, pain, discharge, redness, itching and visual disturbance.   Respiratory:  Negative for apnea, cough, choking, chest tightness, shortness of breath, wheezing and stridor.    Cardiovascular:  Negative for chest pain, palpitations and leg swelling.   Gastrointestinal:  Negative for abdominal  "distention, abdominal pain, anal bleeding, blood in stool, constipation, diarrhea, nausea, rectal pain and vomiting.   Endocrine: Negative for cold intolerance, heat intolerance, polydipsia, polyphagia and polyuria.   Genitourinary:  Negative for decreased urine volume, difficulty urinating, dysuria, enuresis, flank pain, frequency, genital sores, hematuria, penile discharge, penile pain, penile swelling, scrotal swelling, testicular pain and urgency.        Weaker steam    Musculoskeletal:  Positive for arthralgias. Negative for back pain, gait problem, joint swelling, myalgias, neck pain and neck stiffness.   Skin:  Negative for color change, pallor, rash and wound.   Allergic/Immunologic: Negative for environmental allergies, food allergies and immunocompromised state.   Neurological:  Negative for dizziness, tremors, seizures, syncope, facial asymmetry, speech difficulty, weakness, light-headedness, numbness and headaches.   Hematological:  Negative for adenopathy. Does not bruise/bleed easily.   Psychiatric/Behavioral:  Negative for agitation, behavioral problems, confusion, decreased concentration, dysphoric mood, hallucinations, self-injury, sleep disturbance and suicidal ideas. The patient is not nervous/anxious and is not hyperactive.        Objective   Vitals:  /77 (BP Location: Right arm, Patient Position: Sitting)   Pulse 58   Temp 36.4 °C (97.6 °F) (Oral)   Resp 14   Ht 1.676 m (5' 6\")   Wt 101 kg (222 lb 11.2 oz)   SpO2 96%   BMI 35.94 kg/m²       Physical Exam  Constitutional:       Appearance: Normal appearance. He is obese.   HENT:      Head: Normocephalic and atraumatic.      Right Ear: Tympanic membrane, ear canal and external ear normal. There is no impacted cerumen.      Left Ear: Tympanic membrane, ear canal and external ear normal. There is no impacted cerumen.      Nose: Nose normal. No congestion or rhinorrhea.      Mouth/Throat:      Mouth: Mucous membranes are moist.      " Pharynx: Oropharynx is clear. No oropharyngeal exudate or posterior oropharyngeal erythema.   Eyes:      Extraocular Movements: Extraocular movements intact.      Conjunctiva/sclera: Conjunctivae normal.      Pupils: Pupils are equal, round, and reactive to light.   Neck:      Vascular: No carotid bruit.   Cardiovascular:      Rate and Rhythm: Normal rate and regular rhythm.      Pulses: Normal pulses.      Heart sounds: Normal heart sounds. No murmur heard.  Pulmonary:      Effort: Pulmonary effort is normal. No respiratory distress.      Breath sounds: Normal breath sounds. No wheezing, rhonchi or rales.   Abdominal:      General: Abdomen is flat. Bowel sounds are normal. There is no distension.      Palpations: Abdomen is soft.      Tenderness: There is no abdominal tenderness.      Hernia: No hernia is present.   Musculoskeletal:         General: No swelling or tenderness. Normal range of motion.      Cervical back: Normal range of motion and neck supple.      Right lower leg: No edema.      Left lower leg: No edema.   Lymphadenopathy:      Cervical: No cervical adenopathy.   Skin:     General: Skin is warm and dry.      Findings: No lesion or rash.   Neurological:      General: No focal deficit present.      Mental Status: He is alert and oriented to person, place, and time.      Cranial Nerves: No cranial nerve deficit.      Sensory: No sensory deficit.      Motor: No weakness.   Psychiatric:         Mood and Affect: Mood normal.         Behavior: Behavior normal.         Thought Content: Thought content normal.         Judgment: Judgment normal.         Assessment & Plan  HTN (hypertension), benign  Well controlled on current regimen     Orders:    Follow Up In Primary Care - Medicare Annual    Comprehensive Metabolic Panel; Future    CBC; Future    Urinalysis with Reflex Microscopic; Future    Mixed hyperlipidemia  On statin therapy  Repeat lipids  Encouraged weight loss    Orders:    Follow Up In Primary  Care - Medicare Annual    Lipid Panel; Future    BPH without urinary obstruction  Repeat PSA  On Finasteride     Orders:    Follow Up In Primary Care - Medicare Annual    PSA; Future    Urinalysis with Reflex Microscopic; Future    Proteinuria, unspecified type  On ACEI     Orders:    Follow Up In Primary Care - Medicare Annual    Gastroesophageal reflux disease without esophagitis  Uses PPI PRN  Refill printed    Orders:    Follow Up In Primary Care - Medicare Annual    omeprazole (PriLOSEC) 20 mg DR capsule; Take 1-2 capsules (20-40 mg) by mouth once daily as needed (GERD). Do not crush or chew.    Medicare annual wellness visit, subsequent  Discussed need for living will documents for medical wishes  RSV vaccine as option     Orders:    Follow Up In Primary Care - Medicare Annual; Future    Class 2 obesity with body mass index (BMI) of 35 to 39.9 without comorbidity  Encouraged pt to work on weight loss-improvements in diet in particular  Exercise more regularly with goal of 150 minutes/week

## 2025-02-16 DIAGNOSIS — I10 HTN (HYPERTENSION), BENIGN: ICD-10-CM

## 2025-02-17 RX ORDER — LISINOPRIL 40 MG/1
40 TABLET ORAL DAILY
Qty: 90 TABLET | Refills: 1 | Status: SHIPPED | OUTPATIENT
Start: 2025-02-17

## 2025-02-17 RX ORDER — AMLODIPINE BESYLATE 5 MG/1
5 TABLET ORAL DAILY
Qty: 90 TABLET | Refills: 1 | Status: SHIPPED | OUTPATIENT
Start: 2025-02-17

## 2025-02-18 DIAGNOSIS — M79.671 CHRONIC PAIN OF RIGHT HEEL: ICD-10-CM

## 2025-02-18 DIAGNOSIS — G89.29 CHRONIC PAIN OF RIGHT HEEL: ICD-10-CM

## 2025-02-18 RX ORDER — DICLOFENAC SODIUM AND MISOPROSTOL 75; 200 MG/1; UG/1
1 TABLET, DELAYED RELEASE ORAL 2 TIMES DAILY PRN
Qty: 180 TABLET | Refills: 1 | Status: SHIPPED | OUTPATIENT
Start: 2025-02-18

## 2025-04-06 DIAGNOSIS — E78.2 MIXED HYPERLIPIDEMIA: ICD-10-CM

## 2025-04-07 RX ORDER — ATORVASTATIN CALCIUM 10 MG/1
10 TABLET, FILM COATED ORAL NIGHTLY
Qty: 90 TABLET | Refills: 1 | Status: SHIPPED | OUTPATIENT
Start: 2025-04-07

## 2025-07-24 DIAGNOSIS — N40.0 BPH WITHOUT URINARY OBSTRUCTION: ICD-10-CM

## 2025-07-24 RX ORDER — FINASTERIDE 5 MG/1
5 TABLET, FILM COATED ORAL DAILY
Qty: 90 TABLET | Refills: 1 | Status: SHIPPED | OUTPATIENT
Start: 2025-07-24

## 2025-08-10 DIAGNOSIS — I10 HTN (HYPERTENSION), BENIGN: ICD-10-CM

## 2025-08-11 RX ORDER — LISINOPRIL 40 MG/1
40 TABLET ORAL DAILY
Qty: 90 TABLET | Refills: 1 | Status: SHIPPED | OUTPATIENT
Start: 2025-08-11

## 2025-08-18 DIAGNOSIS — R35.0 URINARY FREQUENCY: ICD-10-CM

## 2025-08-18 DIAGNOSIS — R30.0 DYSURIA: Primary | ICD-10-CM

## 2025-08-19 DIAGNOSIS — N40.0 BPH WITHOUT URINARY OBSTRUCTION: Primary | ICD-10-CM

## 2025-08-19 DIAGNOSIS — R30.0 DYSURIA: Primary | ICD-10-CM

## 2025-08-19 DIAGNOSIS — R35.0 URINARY FREQUENCY: ICD-10-CM

## 2025-08-19 LAB
APPEARANCE UR: CLEAR
BACTERIA #/AREA URNS HPF: NORMAL /HPF
BACTERIA UR CULT: NORMAL
BILIRUB UR QL STRIP: NEGATIVE
COLOR UR: YELLOW
GLUCOSE UR QL STRIP: NEGATIVE
HGB UR QL STRIP: NEGATIVE
HYALINE CASTS #/AREA URNS LPF: NORMAL /LPF
KETONES UR QL STRIP: NEGATIVE
LEUKOCYTE ESTERASE UR QL STRIP: NEGATIVE
NITRITE UR QL STRIP: NEGATIVE
PH UR STRIP: 5.5 [PH] (ref 5–8)
PROT UR QL STRIP: NEGATIVE
RBC #/AREA URNS HPF: NORMAL /HPF
SERVICE CMNT-IMP: NORMAL
SP GR UR STRIP: 1.01 (ref 1–1.03)
SQUAMOUS #/AREA URNS HPF: NORMAL /HPF
WBC #/AREA URNS HPF: NORMAL /HPF

## 2025-08-19 RX ORDER — CIPROFLOXACIN 500 MG/1
500 TABLET, FILM COATED ORAL 2 TIMES DAILY
Qty: 20 TABLET | Refills: 0 | Status: SHIPPED | OUTPATIENT
Start: 2025-08-19 | End: 2025-08-29

## 2025-08-19 RX ORDER — TAMSULOSIN HYDROCHLORIDE 0.4 MG/1
0.4 CAPSULE ORAL DAILY
Qty: 90 CAPSULE | Refills: 1 | Status: SHIPPED | OUTPATIENT
Start: 2025-08-19 | End: 2026-02-15

## 2025-08-24 DIAGNOSIS — I10 HTN (HYPERTENSION), BENIGN: ICD-10-CM

## 2025-08-25 RX ORDER — AMLODIPINE BESYLATE 5 MG/1
5 TABLET ORAL DAILY
Qty: 90 TABLET | Refills: 1 | Status: SHIPPED | OUTPATIENT
Start: 2025-08-25

## 2025-12-12 ENCOUNTER — APPOINTMENT (OUTPATIENT)
Dept: PRIMARY CARE | Facility: CLINIC | Age: 75
End: 2025-12-12
Payer: MEDICARE